# Patient Record
Sex: MALE | Race: WHITE | NOT HISPANIC OR LATINO | Employment: OTHER | URBAN - METROPOLITAN AREA
[De-identification: names, ages, dates, MRNs, and addresses within clinical notes are randomized per-mention and may not be internally consistent; named-entity substitution may affect disease eponyms.]

---

## 2023-04-18 ENCOUNTER — TELEPHONE (OUTPATIENT)
Dept: CARDIOLOGY | Facility: CLINIC | Age: 63
End: 2023-04-18
Payer: COMMERCIAL

## 2023-04-18 NOTE — TELEPHONE ENCOUNTER
4/18 talked with pt, pt not able to make appt tomorrow with dr butler at 11. Will be in [place] until Sat night. Will call back with another option [facility]

## 2023-04-19 NOTE — TELEPHONE ENCOUNTER
Action 4/19/23 Deandra  Fax #[phone]   Action Taken Requested:    Echo stress    CT Calcium Scoring   1-3-23    3-23-21     Resolved images in PACS 5/2

## 2023-05-03 ENCOUNTER — OFFICE VISIT (OUTPATIENT)
Dept: CARDIOLOGY | Facility: CLINIC | Age: 63
End: 2023-05-03
Attending: INTERNAL MEDICINE
Payer: COMMERCIAL

## 2023-05-03 ENCOUNTER — TELEPHONE (OUTPATIENT)
Dept: CARDIOLOGY | Facility: CLINIC | Age: 63
End: 2023-05-03
Payer: COMMERCIAL

## 2023-05-03 VITALS
BODY MASS INDEX: 23.11 KG/M2 | OXYGEN SATURATION: 99 % | HEIGHT: 72 IN | WEIGHT: 170.6 LBS | HEART RATE: 44 BPM | SYSTOLIC BLOOD PRESSURE: 155 MMHG | DIASTOLIC BLOOD PRESSURE: 92 MMHG

## 2023-05-03 DIAGNOSIS — R42 POSTURAL DIZZINESS WITH PRESYNCOPE: ICD-10-CM

## 2023-05-03 DIAGNOSIS — R00.1 BRADYCARDIA: Primary | ICD-10-CM

## 2023-05-03 DIAGNOSIS — R55 POSTURAL DIZZINESS WITH PRESYNCOPE: ICD-10-CM

## 2023-05-03 PROCEDURE — 99205 OFFICE O/P NEW HI 60 MIN: CPT | Performed by: INTERNAL MEDICINE

## 2023-05-03 PROCEDURE — 93010 ELECTROCARDIOGRAM REPORT: CPT | Performed by: INTERNAL MEDICINE

## 2023-05-03 PROCEDURE — G0463 HOSPITAL OUTPT CLINIC VISIT: HCPCS | Performed by: INTERNAL MEDICINE

## 2023-05-03 PROCEDURE — 93005 ELECTROCARDIOGRAM TRACING: CPT

## 2023-05-03 RX ORDER — ROSUVASTATIN CALCIUM 20 MG/1
20 TABLET, COATED ORAL
COMMUNITY
Start: 2023-04-06

## 2023-05-03 RX ORDER — LOSARTAN POTASSIUM 50 MG/1
50 TABLET ORAL
COMMUNITY
Start: 2023-04-06

## 2023-05-03 RX ORDER — SODIUM CHLORIDE 9 MG/ML
INJECTION, SOLUTION INTRAVENOUS CONTINUOUS
Status: CANCELLED | OUTPATIENT
Start: 2023-05-03

## 2023-05-03 RX ORDER — NITROGLYCERIN 0.4 MG/1
TABLET SUBLINGUAL
COMMUNITY
Start: 2022-12-02

## 2023-05-03 RX ORDER — EZETIMIBE 10 MG/1
1 TABLET ORAL DAILY
COMMUNITY
Start: 2022-06-14

## 2023-05-03 RX ORDER — LIDOCAINE 40 MG/G
CREAM TOPICAL
Status: CANCELLED | OUTPATIENT
Start: 2023-05-03

## 2023-05-03 ASSESSMENT — PAIN SCALES - GENERAL: PAINLEVEL: NO PAIN (0)

## 2023-05-03 NOTE — NURSING NOTE
Medication Reconciliation:  Rooming staff reviewed and verified all current medications with patient. An updated med list was included in the AVS.    Test Results Reviewed:  EKG, ziopatch results were reviewed by provider with patient today.     EP Procedure:  Patient was given instructions regarding tilt table test. Patient received an instruction letter today for reference. Discussed purpose, preparation, and procedure with patient. Patient verbalized understanding and agreed to call with further questions or concerns. The EP  was notified of need to schedule procedure and post op follow up appointments.    Return appointment:   Patient given instructions regarding scheduling next clinic visit. Patient verbalized understanding.       Adelina Marks RN on 5/3/2023 at 10:18 AM

## 2023-05-03 NOTE — LETTER
5/3/2023      RE: Thad Flores  [address]  [facility] [address]       Dear Colleague,    Thank you for the opportunity to participate in the care of your patient, Thad Flores, at the [facility] [place] [facility]. Please see a copy of my visit note below.        ELECTROPHYSIOLOGY CLINIC VISIT    Assessment/Recommendations   Assessment/Plan:    Mr. Flores is a 62 year old male who has a medical HTN, pre-DM, orthostatism, and nonobstructive CAD.    Bradycardia:   - He has been known to have resting bradycardia since college. He is well conditioned and his bradycardia is attributed to this.   - Feels no exertional limitations and heart monitoring has shown adequate HR distribution.   - Stress echo showed normal cardiac function/structue and no ischemia.   - No current indication for pacing for the time being. See below.    Dizziness:   - Symptoms postural and appears to have orthostatic hypotension.   - Use compression shorts (athletic compression shorts)  - Increase hydration with goal to take in 64-72 oz of water/electrolyte fluids per day.   - Liberalize salt/electrolyte intake  - Change positions carefully and slowly and maintain safe environment.   - Standing training and supine isometric exercises.   - Will arrange tilt table testing and autonomic evaluation with Dr. Murray to lend his expertise.   His orthostatic symptoms have potentially 2 components: cardioinhibitory related to short term chronotropic incompetence, and vasoplegia. An autonomic study and tilt table testing could dissect the contribution of each component. If his BP decrease after tilting or standing without any HR response, then a pacemaker might help his symptoms. If his HR response is adequate with vasoplegia being the main component, then a PPM will not help his symptoms. We will use the results of his autonomic study to evaluate the potential need for  pacing.      Follow up 2 weeks after autonomic study.        History of Present Illness/Subjective    Mr. Thad Flores is a 62 year old male who comes in today for EP consultation of bradycardia and dizziness.    Mr. Flores is a 62 year old male who has a medical HTN, pre-DM, orthostatism, and nonobstructive CAD.  He is seeking a second opinion for postural dizziness and bradycardia.  He saw EP at [facility] on 3/15/2023 complaining of more progressive dizziness.  His losartan was discontinued due to symptoms of dizziness but not clear that this provided much benefit.  He primarily reported dizziness with postural changes.  He was encouraged to maintain adequate hydration.  He uses his Peloton bike without exertional limitation.  He had ECG at our recent office visit that showed resting heart rate was 37 bpm, SB with 1 AVB.  This prompted a Zio patch monitor in 11/2022 that showed HR 32- 139 with average heart rate 48 bpm, 1 NSVT 4 beats, 2 PSVT up to 8 beats.  He had a stress echo in 1/2023 that was negative for ischemia, good exercise duration and workload, normal function, and no symptoms were elicited.  At [facility] office visit, he reported that he is always had a low resting heart rate attributed to his athletic conditioning.  For the last several years he has had dizziness and lightheadedness with postural changes and had a syncopal episode a few years ago after standing up from a couch.  He noted the episodes were occurring several times a day and not resolving as quickly.  He gets a tingling sensation in his abdomen and feels that his body grows heavier as he stands.  He notes occasionally getting some graying out/tunnel vision but no brayden syncope.  He has maintained excellent hydration per his report.  He has been worried about his HR as his smart watch shows that he is frequently getting HR in the low 40s and into the high 30s at times.  Apparently PPM was discussed with him at the visit but he had elected  "to monitor.  He then asked to pursue a second opinion for which he presents today.    He reports feeling at baseline. He was offered a PPM through [employer]. He has had bradycardia since college years. He has postural dizziness at times but no dizziness without changing posture. This was managed with fluids and increased salt intake. Now having worsening orthostatism. He feels best after exercising. HbA1C 6-6.5. He denies chest discomfort, palpitations, abdominal fullness/bloating or peripheral edema, shortness of breath, paroxysmal nocturnal dyspnea, orthopnea, lightheadedness, dizziness, pre-syncope, or syncope. Presenting 12 lead ECG shows SB 1 AVB Vent Rate 40 bpm,  ms,  ms, QTc 414 ms. . Current cardiac medications include: Crestor, Losartan, Zetia, and ASA.         I have reviewed and updated the patient's Past Medical History, Social History, Family History and Medication List.     Cardiographics (Personally Reviewed) :   1/3/23 Stress Echo (OSH Report):   Final Impressions:    1. Negative stress echo for ischemia.    2. Post stress, decreased left ventricular size, increased global systolic function with an estimated EF of 70 to 75%.    3. Maximum stress test with 88.3% of age predicted maximum heart rate achieved.    4. There were no ischemic changes by EKG during stress.    5. Good exercise duration and workload.    6. During stress exam the patient developed no significant symptoms.    7. 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    8. Compared to 6/18/2020 dilated sinuses of valsalva (prior to 3.8 cm).          Physical Examination   BP (!) 155/92 (BP Location: Right arm, Patient Position: Sitting, Cuff Size: Adult Regular)   Pulse (!) 44   Ht 1.831 m (6' 0.09\")   Wt 77.4 kg (170 lb 9.6 oz)   SpO2 99%   BMI 23.08 kg/m    Wt Readings from Last 3 Encounters:   01/29/16 78.9 kg (174 lb)   11/24/14 79.1 kg (174 lb 4.8 oz)     General Appearance:   Alert, well-appearing and " in no acute distress.   HEENT: Atraumatic, normocephalic. PERRL.  MMM.   Chest/Lungs:   Respirations unlabored.  Lungs are clear to auscultation.   Cardiovascular:   Regular rate and rhythm.  S1/S2. No murmur.    Abdomen:  Soft, nontender, nondistended.   Extremities: No cyanosis or clubbing. No edema.    Musculoskeletal: Moves all extremities.  Gait normal.   Skin: Warm, dry, intact.    Neurologic: Mood and affect are appropriate.  Alert and oriented to person, place, time, and situation.          Medications  Allergies   Current Outpatient Medications   Medication Sig Dispense Refill    aspirin 81 MG tablet Take 81 mg by mouth daily      atorvastatin (LIPITOR) 20 MG tablet Take 20 mg by mouth daily      Cholecalciferol (VITAMIN D3 PO) Take 2,000 Units by mouth daily      CINNAMON PO Take 1,000 mg by mouth daily      Omega-3 Fatty Acids (OMEGA-3 FISH OIL PO) Take 1 g by mouth daily      Allergies   Allergen Reactions    Aspirin Anaphylaxis     Reported rash and anaphylaxis with concomitant use of IBF, tests with allergist did not confirm aspirin allergy.    Ibuprofen Rash     With aspirin use anaphylaxis.    Penicillins      Fascial swelling         Lab Results (Personally Reviewed)    Chemistry/lipid CBC Cardiac Enzymes/BNP/TSH/INR   Lab Results   Component Value Date    BUN 23 01/29/2016     01/29/2016    CO2 30 01/29/2016     Creatinine   Date Value Ref Range Status   01/29/2016 1.07 0.66 - 1.25 mg/dL Final       Lab Results   Component Value Date    CHOL 128 01/29/2016    HDL 67 01/29/2016    LDL 53 01/29/2016      Lab Results   Component Value Date     (A) 11/19/2014    Lab Results   Component Value Date    INR 1.14 06/29/2010        The patient states understanding and is agreeable with the plan.   Carlos Meneses MD [facility]  Cardiology - Electrophysiology      Total time spent on patient visit, reviewing notes, imaging, labs, orders, and completing necessary documentation: 60 minutes.

## 2023-05-03 NOTE — PROGRESS NOTES
ELECTROPHYSIOLOGY CLINIC VISIT    Assessment/Recommendations   Assessment/Plan:    Mr. Flores is a 62 year old male who has a medical HTN, pre-DM, orthostatism, and nonobstructive CAD.    Bradycardia:   - He has been known to have resting bradycardia since college. He is well conditioned and his bradycardia is attributed to this.   - Feels no exertional limitations and heart monitoring has shown adequate HR distribution.   - Stress echo showed normal cardiac function/structue and no ischemia.   - No current indication for pacing for the time being. See below.    Dizziness:   - Symptoms postural and appears to have orthostatic hypotension.   - Use compression shorts (athletic compression shorts)  - Increase hydration with goal to take in 64-72 oz of water/electrolyte fluids per day.   - Liberalize salt/electrolyte intake  - Change positions carefully and slowly and maintain safe environment.   - Standing training and supine isometric exercises.   - Will arrange tilt table testing and autonomic evaluation with Dr. Murray to lend his expertise.   His orthostatic symptoms have potentially 2 components: cardioinhibitory related to short term chronotropic incompetence, and vasoplegia. An autonomic study and tilt table testing could dissect the contribution of each component. If his BP decrease after tilting or standing without any HR response, then a pacemaker might help his symptoms. If his HR response is adequate with vasoplegia being the main component, then a PPM will not help his symptoms. We will use the results of his autonomic study to evaluate the potential need for pacing.      Follow up 2 weeks after autonomic study.        History of Present Illness/Subjective    Mr. Thad Flores is a 62 year old male who comes in today for EP consultation of bradycardia and dizziness.    Mr. Flores is a 62 year old male who has a medical HTN, pre-DM, orthostatism, and nonobstructive CAD.  He is seeking a second  opinion for postural dizziness and bradycardia.  He saw EP at [facility] on 3/15/2023 complaining of more progressive dizziness.  His losartan was discontinued due to symptoms of dizziness but not clear that this provided much benefit.  He primarily reported dizziness with postural changes.  He was encouraged to maintain adequate hydration.  He uses his Peloton bike without exertional limitation.  He had ECG at our recent office visit that showed resting heart rate was 37 bpm, SB with 1 AVB.  This prompted a Zio patch monitor in 11/2022 that showed HR 32- 139 with average heart rate 48 bpm, 1 NSVT 4 beats, 2 PSVT up to 8 beats.  He had a stress echo in 1/2023 that was negative for ischemia, good exercise duration and workload, normal function, and no symptoms were elicited.  At [facility] office visit, he reported that he is always had a low resting heart rate attributed to his athletic conditioning.  For the last several years he has had dizziness and lightheadedness with postural changes and had a syncopal episode a few years ago after standing up from a couch.  He noted the episodes were occurring several times a day and not resolving as quickly.  He gets a tingling sensation in his abdomen and feels that his body grows heavier as he stands.  He notes occasionally getting some graying out/tunnel vision but no brayden syncope.  He has maintained excellent hydration per his report.  He has been worried about his HR as his smart watch shows that he is frequently getting HR in the low 40s and into the high 30s at times.  Apparently PPM was discussed with him at the visit but he had elected to monitor.  He then asked to pursue a second opinion for which he presents today.    He reports feeling at baseline. He was offered a PPM through [facility]. He has had bradycardia since college years. He has postural dizziness at times but no dizziness without changing posture. This was managed with fluids and increased salt intake. Now having  "worsening orthostatism. He feels best after exercising. HbA1C 6-6.5. He denies chest discomfort, palpitations, abdominal fullness/bloating or peripheral edema, shortness of breath, paroxysmal nocturnal dyspnea, orthopnea, lightheadedness, dizziness, pre-syncope, or syncope. Presenting 12 lead ECG shows SB 1 AVB Vent Rate 40 bpm,  ms,  ms, QTc 414 ms. . Current cardiac medications include: Crestor, Losartan, Zetia, and ASA.         I have reviewed and updated the patient's Past Medical History, Social History, Family History and Medication List.     Cardiographics (Personally Reviewed) :   1/3/23 Stress Echo (OSH Report):   Final Impressions:    1. Negative stress echo for ischemia.    2. Post stress, decreased left ventricular size, increased global systolic function with an estimated EF of 70 to 75%.    3. Maximum stress test with 88.3% of age predicted maximum heart rate achieved.    4. There were no ischemic changes by EKG during stress.    5. Good exercise duration and workload.    6. During stress exam the patient developed no significant symptoms.    7. 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    8. Compared to 6/18/2020 dilated sinuses of valsalva (prior to 3.8 cm).          Physical Examination   BP (!) 155/92 (BP Location: Right arm, Patient Position: Sitting, Cuff Size: Adult Regular)   Pulse (!) 44   Ht 1.831 m (6' 0.09\")   Wt 77.4 kg (170 lb 9.6 oz)   SpO2 99%   BMI 23.08 kg/m    Wt Readings from Last 3 Encounters:   01/29/16 78.9 kg (174 lb)   11/24/14 79.1 kg (174 lb 4.8 oz)     General Appearance:   Alert, well-appearing and in no acute distress.   HEENT: Atraumatic, normocephalic. PERRL.  MMM.   Chest/Lungs:   Respirations unlabored.  Lungs are clear to auscultation.   Cardiovascular:   Regular rate and rhythm.  S1/S2. No murmur.    Abdomen:  Soft, nontender, nondistended.   Extremities: No cyanosis or clubbing. No edema.    Musculoskeletal: Moves all " extremities.  Gait normal.   Skin: Warm, dry, intact.    Neurologic: Mood and affect are appropriate.  Alert and oriented to person, place, time, and situation.          Medications  Allergies   Current Outpatient Medications   Medication Sig Dispense Refill     aspirin 81 MG tablet Take 81 mg by mouth daily       atorvastatin (LIPITOR) 20 MG tablet Take 20 mg by mouth daily       Cholecalciferol (VITAMIN D3 PO) Take 2,000 Units by mouth daily       CINNAMON PO Take 1,000 mg by mouth daily       Omega-3 Fatty Acids (OMEGA-3 FISH OIL PO) Take 1 g by mouth daily      Allergies   Allergen Reactions     Aspirin Anaphylaxis     Reported rash and anaphylaxis with concomitant use of IBF, tests with allergist did not confirm aspirin allergy.     Ibuprofen Rash     With aspirin use anaphylaxis.     Penicillins      Fascial swelling         Lab Results (Personally Reviewed)    Chemistry/lipid CBC Cardiac Enzymes/BNP/TSH/INR   Lab Results   Component Value Date    BUN 23 01/29/2016     01/29/2016    CO2 30 01/29/2016     Creatinine   Date Value Ref Range Status   01/29/2016 1.07 0.66 - 1.25 mg/dL Final       Lab Results   Component Value Date    CHOL 128 01/29/2016    HDL 67 01/29/2016    LDL 53 01/29/2016      Lab Results   Component Value Date     (A) 11/19/2014    Lab Results   Component Value Date    INR 1.14 06/29/2010        The patient states understanding and is agreeable with the plan.   Carlos Meneses MD [facility]  Cardiology - Electrophysiology      Total time spent on patient visit, reviewing notes, imaging, labs, orders, and completing necessary documentation: 60 minutes.

## 2023-05-03 NOTE — NURSING NOTE
Chief Complaint   Patient presents with     New Patient     2nd opinion for postural dizziness, bradycardia. Review zio on CE from 11/2022       Vitals were taken, medications reconciled and EKG performed.    Manuela Delgado, EMT   9:10 AM

## 2023-05-03 NOTE — PATIENT INSTRUCTIONS
Tilt Table/Autonomic Study    Visitor Policy: Two visitors.    Below are instructions, if you have questions please contact our office.     1. You should arrive at the [facility] at _______  ([address] [facility], [place]: [phone]).    2. Report to the GOLD waiting room. Your procedure will be done in the Catheterization Lab.     3. Wear comfortable clothing. (sweat/yoga pants, t-shirt). Please allow 3-4 hours for this procedure to be completed.     4. Do not eat or drink ANYTHING for 3 hours prior to arrival.     5. The morning of your procedure, you may take any scheduled medications with a SIP of water- unless you were instructed differently by your physician. Do not take any vitamins, minerals or herbal supplements.     6. You may drive yourself to and from this procedure, although we recommend a  incase you do not feel well afterwards.       Follow-up 2 weeks after with Dr Meneses.      If you have further questions, please utilize [employer] to contact us.   If your question concerns the above instructions, contact:  Adelina Marks RN  Electrophysiology Nurse Coordinator.  [phone]    If your question concerns the schedule/appointment times, contact:  [facility]   [phone]

## 2023-05-03 NOTE — TELEPHONE ENCOUNTER
EP Scheduling called the patient to schedule Tilt Table Study with Dr. Murray. The number [phone] was left for the patient to return the call and schedule the procedure.    Shelby Patel  Periop Electrophysiology   [phone]

## 2023-05-05 LAB
ATRIAL RATE - MUSE: 40 BPM
DIASTOLIC BLOOD PRESSURE - MUSE: NORMAL MMHG
INTERPRETATION ECG - MUSE: NORMAL
P AXIS - MUSE: 49 DEGREES
PR INTERVAL - MUSE: 218 MS
QRS DURATION - MUSE: 104 MS
QT - MUSE: 508 MS
QTC - MUSE: 414 MS
R AXIS - MUSE: 61 DEGREES
SYSTOLIC BLOOD PRESSURE - MUSE: NORMAL MMHG
T AXIS - MUSE: 46 DEGREES
VENTRICULAR RATE- MUSE: 40 BPM

## 2023-05-24 ENCOUNTER — PRE VISIT (OUTPATIENT)
Dept: CARDIOLOGY | Facility: CLINIC | Age: 63
End: 2023-05-24
Payer: COMMERCIAL

## 2023-06-02 ENCOUNTER — APPOINTMENT (OUTPATIENT)
Dept: MEDSURG UNIT | Facility: CLINIC | Age: 63
End: 2023-06-02
Attending: INTERNAL MEDICINE
Payer: COMMERCIAL

## 2023-06-02 ENCOUNTER — HOSPITAL ENCOUNTER (OUTPATIENT)
Facility: CLINIC | Age: 63
Discharge: HOME | End: 2023-06-02
Attending: INTERNAL MEDICINE | Admitting: INTERNAL MEDICINE
Payer: COMMERCIAL

## 2023-06-02 VITALS
DIASTOLIC BLOOD PRESSURE: 46 MMHG | HEART RATE: 39 BPM | WEIGHT: 167.2 LBS | BODY MASS INDEX: 22.65 KG/M2 | TEMPERATURE: 98.1 F | HEIGHT: 72 IN | SYSTOLIC BLOOD PRESSURE: 138 MMHG | OXYGEN SATURATION: 99 % | RESPIRATION RATE: 16 BRPM

## 2023-06-02 DIAGNOSIS — R42 POSTURAL DIZZINESS WITH PRESYNCOPE: ICD-10-CM

## 2023-06-02 DIAGNOSIS — R00.1 BRADYCARDIA: ICD-10-CM

## 2023-06-02 DIAGNOSIS — R55 POSTURAL DIZZINESS WITH PRESYNCOPE: ICD-10-CM

## 2023-06-02 LAB — GLUCOSE BLDC GLUCOMTR-MCNC: 132 MG/DL (ref 70–99)

## 2023-06-02 PROCEDURE — 93660 TILT TABLE EVALUATION: CPT | Mod: 26 | Performed by: INTERNAL MEDICINE

## 2023-06-02 PROCEDURE — 93660 TILT TABLE EVALUATION: CPT | Performed by: INTERNAL MEDICINE

## 2023-06-02 PROCEDURE — 258N000003 HC RX IP 258 OP 636: Performed by: INTERNAL MEDICINE

## 2023-06-02 PROCEDURE — 95921 AUTONOMIC NRV PARASYM INERVJ: CPT | Mod: 26 | Performed by: INTERNAL MEDICINE

## 2023-06-02 PROCEDURE — 95921 AUTONOMIC NRV PARASYM INERVJ: CPT | Performed by: INTERNAL MEDICINE

## 2023-06-02 PROCEDURE — 272N000001 HC OR GENERAL SUPPLY STERILE: Performed by: INTERNAL MEDICINE

## 2023-06-02 PROCEDURE — 82962 GLUCOSE BLOOD TEST: CPT

## 2023-06-02 RX ORDER — LIDOCAINE 40 MG/G
CREAM TOPICAL
Status: DISCONTINUED | OUTPATIENT
Start: 2023-06-02 | End: 2023-06-02 | Stop reason: HOSPADM

## 2023-06-02 RX ORDER — SODIUM CHLORIDE 9 MG/ML
INJECTION, SOLUTION INTRAVENOUS CONTINUOUS
Status: DISCONTINUED | OUTPATIENT
Start: 2023-06-02 | End: 2023-06-02 | Stop reason: HOSPADM

## 2023-06-02 RX ADMIN — SODIUM CHLORIDE 250 ML: 9 INJECTION, SOLUTION INTRAVENOUS at 10:46

## 2023-06-02 ASSESSMENT — ACTIVITIES OF DAILY LIVING (ADL)
ADLS_ACUITY_SCORE: 35

## 2023-06-02 NOTE — DISCHARGE INSTRUCTIONS
[facility]  DISCHARGE INSTRUCTIONS FOR   TILT TABLE WITH  VASOVAGAL SYNCOPE    Date: June 2, 2023    Plan:  Your tilt table showed you have vasovagal syncope.  - Use compression shorts (athletic compression shorts).  - Increase hydration with goal to take in 64 oz of water/electrolyte fluids per day.  Recommend drinking 8-10 oz. Try to avoid high carbohydrate electrolyte drinks.  - Eat six small meals per day with focus on foods low in carbohydrates and low in gluten.  - Liberalize salt intake.  - Change positions carefully and slowly and maintain safe environment.  - Standing training and supine isometric exercises.    Cardiovascular Clinic:  [address] [place] [address], [id number]    Your Care Team:         Cardiology      Telephone Number         Dr. Arnaldo Chang                [phone]         Bhargavi Marks, MELISA Vargas RN              [phone]         For Scheduling Appointments or              Procedures:      Shelby Morgan               [phone]       As always, Thank you for trusting us with your health care needs!

## 2023-06-02 NOTE — PROCEDURES
EP PROCEDURE NOTE    *Procedures:*    1. TILT table test.  2. Autonomic function test.    *Cardiologist:*  Arnaldo Murray    *EP Fellow:*  N/A    *Referring MD: *  Dr Carlos Hearn    *Pre-operative Diagnosis:*  Syncope.    *Complications:*  None.     *Medications:*  NTG 0.4mg SL/None.     *Clinical Profile:*  Maximino liz is an athletic 62-year-old male who comes for second opinion regarding postural hypotension with lightheadedness and 1 prior syncope related to arising from the couch.  He is without any evidence of structural heart disease and has had thorough evaluation.  He is without any exertional symptoms.  He does note bradycardia at night in the 30s based on Apple Watch.  A pacemaker was suggested at [employer] triggering the patient's coming for a second opinion to see .    The patient is here for autonomic testing including tilt testing.     Please see [facility] visit note for details.      PROCEDURE    The risks and benefits of the procedure were explained to the patient in   full. Written informed consent was obtained. The patient was brought to the   EP lab in a fasting and hemodynamically stable condition. Physical   examination of the patient did not reveal any carotid bruits, and review of   the chart did not reveal the presence of stroke or TIA within the past   three months.     The following maneuvers were done sequentially:    1- Sitting for 5 minutes:   End of 5 min:  HR 37, /86    2- Standing for 10 minutes:   Baseline HR 46 /77  Immediate Damir HR 50   /56     time from standing to damir 23 seconds      time from damir to recovery 18 seconds  Recovery HR 42, recovery  /80  30 sec: HR 44, /85  1 min: HR 41, /84  2 min: HR 43, /86  3 min: HR 44, /81  4 min: HR 42, /82  5 min: HR 45, /91  6 min: HR 42, /81  7 min: HR 45, /85  8 min: HR 42, /82  9 min: HR 43, /81  10 min: HR 44, BP  149/79    Patient has no symptoms despite a substantial drop in blood pressure with the media tab posture change (approximately -40 mmHg).  The delayed recovery is borderline abnormal.    2- Maneuvers in seated position:    A. Carotid sinus massage:  Right:  Baseline HR 39 /81  HR 31, /68.     Left:  Baseline HR 40 /83  HR 36, /76    Symptoms: No symptoms reported, no evidence of marked vaso depression.  Cardio inhibition within expected range.    B. Valsalva:   Baseline: HR 41, /89  Phase 1: HR 38, Max /107  Phase 2: HR 50, Min /122  Phase 3: Present/  Phase 4 (Overshoot): HR 44, Max /78    Valsalva ratio 50/44= 1.13 (abnormal low)  A second Valsalva measurement was undertaken with a base heart rate of 44 and a maximum heart rate of 52 with a recovery rate of 44 (Valsalva ratio= 52/44= 1.18 (abnormal low)    Absence of phase 4 overshoot in both tests    Symptoms: None    C.  Respiratory sinus arrhythmia  In 40 out 30 delta 10  In 39 out 31 delta 8  In 30 out 31  Delta 8  In 40 to about 30 delta 12    Average delta  38/4= 9.5 (borderline value)  A second set of measurements was undertaken on the same date of 7 concluded that the delta was 5.4.  Overall the findings are borderline abnormal.    D, Shrug/Stretch test  Seated     Baseline HR 41 /89     Peak value HR 44 /109     Damir HR 50 /77     Recovery HR 40 /87   about 20 seconds    Supine     Baseline HR 39 /68     Maximum HR 45 /88     Damir HR 45 /54           recovery about 18 seconds      3- Supine rest for 5 minutes:       4- TILT at 70 degrees for 20 minutes: (1 liter fluid was given before tilt)  Baseline HR 46, /75  Immediate Damir HR 46   /64     time from standing to damir 19 seconds      time from damir to recovery 17 seconds  HR recovery 44, recovery blood pressure 128/76    30 seconds HR 41 /72  1 min: HR 43, BP 96/64  2 min: HR 45, /73  3  min: HR 43, /75  4 min: HR 46, /75  5 min: HR 44, /74  6 min: HR 44, /76  7 min: HR 45, /73  8 min: HR 42, /75  9 min: HR 46, /78  10 min: HR 46, /91  Portion of study terminated  No symptoms but immediate orthostatic hypotensive response of approximately 25 mmHg noted    5-saline 1 L infused    Repeat tilt>> No immediate OH  Repeat Stand>> immediate OH of approx 45 mm Hg present but brief     DISCUSSION:  Autonomic studies were abnormal.  Valsalva was repeated twice and both were identical with no overshoot and low Valsalva ratio.  Respiratory sinus arrhythmia was borderline.  Patient exhibited immediate orthostatic hypotensive response of approximately 40 mmHg with active standing and 25 mmHg with tilt.  Both values are abnormal.      There was no evidence of significant cardio inhibition or vaso depression.  A pacemaker would not likely be helpful.    Findings were discussed with the patient and spouse on station 2A.  There does not appear to be any indication for a cardiac pacemaker.  Patient's tendency to higher blood pressures was noted and may preclude use of aggressive vasoconstrictors such as midodrine.  Volume loading with saline proved helpful with diminishment of immediate orthostatic findings.  These could be encouraged although carte need re BP response.    Follow-up with repeat testing after increased electrolyte volume intake may be desireable given concern re BP.     Possibly neurologic consultation may be helpful as he may be exhibiting early autonomic dysfunction    Dr Murray was present throughout the entire procedure.    I appreciated the opportunity to see and assess Mr Flores and direct the procedure described above . The above note summarizes my findings and current recommendations. Please do not hesitate to contact me if you have any questions or concerns.    Arnaldo Murray MD [facility]   Pager [phone]  Office [phone]

## 2023-06-02 NOTE — PROGRESS NOTES
Arrived to Unit [address] for Tilt table study procedure in EP lab. AFVSS. HR 40s; baseline for pt. Pt's wife, Chiquis, at bedside. Pt stable; ready for consent.

## 2023-06-02 NOTE — PROGRESS NOTES
Pt here post Tilt Table; pt awake and alert, denies pain. Dr Murray to see pt at bedside, Dr Murray reports no instructions needed as he already has follow up appointment with Dr Meneses. IV discontinued; catheter intact. Wife at bedside. 1512--discharged to self care with family; pt reports he has all belongings.

## 2023-06-02 NOTE — H&P
H&P    Thad Flores is a fit 62-year-old male referred by  for further assessment of postural lightheadedness.  Patient came to cardiology for a second opinion regarding the symptoms after being offered a pacemaker at [facility].    Patient indicates that he has a long history of low heart rates and athleticism.  He exercises vigorously on his own Peloton.  Stress exercise test showed adequate heart rate response and no symptoms with no evidence of ischemia.  Baseline ECG shows predominantly sinus bradycardia.    Patient has in the past been treated with losartan which has been stopped but had no impact on his symptoms which are predominantly postural induced lightheadedness.  He did have 1 syncopal episode several years ago after getting up from lying on the couch.    Maximino was seen today on station 2A we discussed the likelihood that his current symptoms may be volume related versus underlying autonomic disturbance.  Pacemaker does not usually compensate adequately for such conditions but the evaluation should be helpful in terms of developing a treatment strategy.  Written informed consent was obtained.    Current medications are as noted below.      Medications  Allergies   Current Outpatient Prescriptions          Current Outpatient Medications   Medication Sig Dispense Refill     aspirin 81 MG tablet Take 81 mg by mouth daily         atorvastatin (LIPITOR) 20 MG tablet Take 20 mg by mouth daily         Cholecalciferol (VITAMIN D3 PO) Take 2,000 Units by mouth daily         CINNAMON PO Take 1,000 mg by mouth daily         Omega-3 Fatty Acids (OMEGA-3 FISH OIL PO) Take 1 g by mouth daily                  Allergies   Allergen Reactions     Aspirin Anaphylaxis       Reported rash and anaphylaxis with concomitant use of IBF, tests with allergist did not confirm aspirin allergy.     Ibuprofen Rash       With aspirin use anaphylaxis.     Penicillins         Fascial swelling          Family  history-noncontributory    Review of systems:  Head neck: No complaints of headache or other visual auditory or olfactory symptoms apart from what is mentioned in HPI    Respiratory: No cough or sputum  Cardiac: No exertional chest discomfort or palpitations  GI: Normal  : Normal  Extremities: No complaints of arthritis or rheumatologic condition  Neurologic normal      Physical examination  Patient is a healthy appearing 62-year-old in no distress.  Examination of the head and neck reveals normal eyes ears nose and mouth jugular veins are not distended    Respiratory is normal with no evidence of respiratory difficulty no retractions and no adventitious sounds    Cardiac: Patient has had detailed cardiac studies and has no evidence of underlying structural heart disease based on physical examination, stress test, and ECG    Abdomen: No abnormalities    Neurologic: Normal    Periphery: No edema or cyanosis.    Laboratory:  See Dr. Marcial's clinic note    Impression  1.  Orthostatic hypotension in an athletic individual with baseline sinus bradycardia of longstanding    Plan autonomic study as discussed with the patient in the clinic    I very much appreciated the opportunity to see and assess Thad Flores in the hospital  The note above summarizes my findings and current recommendations.  Please do not hesitate to contact my office if you have any questions or concerns.      Arnaldo Murray MD  Cardiac Arrhythmia Service  [facility]  [phone]

## 2023-07-02 ENCOUNTER — HEALTH MAINTENANCE LETTER (OUTPATIENT)
Age: 63
End: 2023-07-02

## 2023-07-12 ENCOUNTER — OFFICE VISIT (OUTPATIENT)
Dept: CARDIOLOGY | Facility: CLINIC | Age: 63
End: 2023-07-12
Attending: INTERNAL MEDICINE
Payer: COMMERCIAL

## 2023-07-12 VITALS
SYSTOLIC BLOOD PRESSURE: 155 MMHG | HEART RATE: 43 BPM | OXYGEN SATURATION: 100 % | HEIGHT: 72 IN | WEIGHT: 170 LBS | BODY MASS INDEX: 23.03 KG/M2 | DIASTOLIC BLOOD PRESSURE: 92 MMHG

## 2023-07-12 DIAGNOSIS — R42 POSTURAL DIZZINESS WITH PRESYNCOPE: Primary | ICD-10-CM

## 2023-07-12 DIAGNOSIS — R00.1 BRADYCARDIA: ICD-10-CM

## 2023-07-12 DIAGNOSIS — R55 POSTURAL DIZZINESS WITH PRESYNCOPE: Primary | ICD-10-CM

## 2023-07-12 PROCEDURE — G0463 HOSPITAL OUTPT CLINIC VISIT: HCPCS | Performed by: INTERNAL MEDICINE

## 2023-07-12 PROCEDURE — 99215 OFFICE O/P EST HI 40 MIN: CPT | Performed by: INTERNAL MEDICINE

## 2023-07-12 ASSESSMENT — PAIN SCALES - GENERAL: PAINLEVEL: NO PAIN (0)

## 2023-07-12 NOTE — LETTER
7/12/2023      RE: Thad Flores  [address]  [facility] [address]       Dear Colleague,    Thank you for the opportunity to participate in the care of your patient, Thad Flores, at the [facility] [place] [facility]. Please see a copy of my visit note below.        ELECTROPHYSIOLOGY CLINIC VISIT    Assessment/Recommendations   Assessment/Plan:    Mr. Flores is a 62 year old male who has a medical HTN, pre-DM, orthostatism, and nonobstructive CAD.    Bradycardia and Dizziness (early autonomic dysfunction?): Patient was referred for symptom primarily of orthostatic hypotension, which he was recommended to have a pacemaker. He was referred for second opinion with us, and we recommended that he have a tilt-table study. Dr. Murray completed this and noted that he had impaired recovery during valsalva. His conclusion was that he potentially could be developing early autonomic dysfunction but recommended to stay well hydrated, etc. He has increased his water intake and thinks his symptoms have improved.  - He has been known to have resting bradycardia since college. He is well conditioned and his bradycardia is attributed to this.   - Feels no exertional limitations and heart monitoring has shown adequate HR distribution.   - Stress echo showed normal cardiac function/structue and no ischemia.   - No current indication for pacing for the time being.   - Symptoms postural and appears to have orthostatic hypotension.       - Use compression shorts (athletic compression shorts)      - Increase hydration with goal to take in 64-72 oz of water/electrolyte fluids per day.       - Liberalize salt/electrolyte intake      - Change positions carefully and slowly and maintain safe environment.       - Standing training and supine isometric exercises.     His orthostatic symptoms have potentially 2 components: cardioinhibitory related to short term  chronotropic incompetence, and vasoplegia. During his tilt table test his heart rate appeared to have impaired response. Potentially a pacemaker could help his symptoms but unlikely it would solve the whole issue. For now increasing fluids has seemed to help so will continue monitoring him.    Plan for today:  1. Neurology referral to see if there is any sort of early signs of early CNS disease / autonomic testing  2. Follow up in 3 months in EP clinic for further assessment and evaluation.    Patient seen and discussed with Dr. Meneses.    Colton Cortez MD PhD  Cardiac Electrophysiology Fellow  P: Text Page        History of Present Illness/Subjective    Mr. Thad Flores is a 62 year old male who comes in today for EP consultation of bradycardia and dizziness.    Mr. Flores is a 62 year old male who has a medical HTN, pre-DM, orthostatism, and nonobstructive CAD.  He is seeking a second opinion for postural dizziness and bradycardia.  He saw EP at [facility] on 3/15/2023 complaining of more progressive dizziness.  His losartan was discontinued due to symptoms of dizziness but not clear that this provided much benefit.  He primarily reported dizziness with postural changes.  He was encouraged to maintain adequate hydration.  He uses his Peloton bike without exertional limitation.  He had ECG at our recent office visit that showed resting heart rate was 37 bpm, SB with 1 AVB.  This prompted a Zio patch monitor in 11/2022 that showed HR 32- 139 with average heart rate 48 bpm, 1 NSVT 4 beats, 2 PSVT up to 8 beats.  He had a stress echo in 1/2023 that was negative for ischemia, good exercise duration and workload, normal function, and no symptoms were elicited.  At [facility] office visit, he reported that he is always had a low resting heart rate attributed to his athletic conditioning.  For the last several years he has had dizziness and lightheadedness with postural changes and had a syncopal episode a few years ago  after standing up from a couch.  He noted the episodes were occurring several times a day and not resolving as quickly.  He gets a tingling sensation in his abdomen and feels that his body grows heavier as he stands.  He notes occasionally getting some graying out/tunnel vision but no brayden syncope.  He has maintained excellent hydration per his report.  He has been worried about his HR as his smart watch shows that he is frequently getting HR in the low 40s and into the high 30s at times.  Apparently PPM was discussed with him at the visit but he had elected to monitor.  He then asked to pursue a second opinion for which he presents today.    He reports feeling at baseline. He was offered a PPM through [facility]. He has had bradycardia since college years. He has postural dizziness at times but no dizziness without changing posture. This was managed with fluids and increased salt intake. Now having worsening orthostatism. He feels best after exercising. HbA1C 6-6.5. He denies chest discomfort, palpitations, abdominal fullness/bloating or peripheral edema, shortness of breath, paroxysmal nocturnal dyspnea, orthopnea, lightheadedness, dizziness, pre-syncope, or syncope. Presenting 12 lead ECG shows SB 1 AVB Vent Rate 40 bpm,  ms,  ms, QTc 414 ms. . Current cardiac medications include: Crestor, Losartan, Zetia, and ASA.     EP Visit 7/12/2023: Patient presents today for follow up. He was referred to Dr. Murray for tilt table testing. Testing was notable for impaired heart rate recovery time. Recommendations following testing were to increase hydration, liberalize salt intake, and wear tight fitting shorts / pants. He has done this and his symptoms are significantly improved. He is still able to exercise--symptoms seem to only be present with leisurely activity (mildly light-headedness), however, if he is intensely biking or swimming he doesn't seem to have any symptoms. No other symptoms--no fevers, chills,  chest pain, shortness of breath, abdominal pain, nausea, vomiting, or diarrhea.    I have reviewed and updated the patient's Past Medical History, Social History, Family History and Medication List.     Cardiographics (Personally Reviewed) :   1/3/23 Stress Echo (OSH Report):   Final Impressions:    1. Negative stress echo for ischemia.    2. Post stress, decreased left ventricular size, increased global systolic function with an estimated EF of 70 to 75%.    3. Maximum stress test with 88.3% of age predicted maximum heart rate achieved.    4. There were no ischemic changes by EKG during stress.    5. Good exercise duration and workload.    6. During stress exam the patient developed no significant symptoms.    7. 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    8. Compared to 6/18/2020 dilated sinuses of valsalva (prior to 3.8 cm).          Physical Examination   There were no vitals taken for this visit.  Wt Readings from Last 3 Encounters:   06/02/23 75.8 kg (167 lb 3.2 oz)   05/03/23 77.4 kg (170 lb 9.6 oz)   01/29/16 78.9 kg (174 lb)     General Appearance:   Alert, well-appearing and in no acute distress.   HEENT: Atraumatic, normocephalic. PERRL.  MMM.   Chest/Lungs:   Respirations unlabored.  Lungs are clear to auscultation.   Cardiovascular:   Regular rate and rhythm.  S1/S2. No murmur.    Abdomen:  Soft, nontender, nondistended.   Extremities: No cyanosis or clubbing. No edema.    Musculoskeletal: Moves all extremities.  Gait normal.   Skin: Warm, dry, intact.    Neurologic: Mood and affect are appropriate.  Alert and oriented to person, place, time, and situation.          Medications  Allergies   Current Outpatient Medications   Medication Sig Dispense Refill    aspirin 81 MG tablet Take 81 mg by mouth daily      Cholecalciferol (VITAMIN D3 PO) Take 2,000 Units by mouth daily      cyanocobalamin (VITAMIN B-12) 1000 MCG sublingual tablet       ezetimibe (ZETIA) 10 MG tablet Take 1 tablet  "by mouth daily      losartan (COZAAR) 50 MG tablet Take 50 mg by mouth      metFORMIN (GLUCOPHAGE) 500 MG tablet Take 500 mg by mouth      nitroGLYcerin (NITROSTAT) 0.4 MG sublingual tablet       psyllium (METAMUCIL/KONSYL) Packet Take 1 packet by mouth daily      rosuvastatin (CRESTOR) 20 MG tablet Take 20 mg by mouth      Allergies   Allergen Reactions    Aspirin Anaphylaxis     Reported rash and anaphylaxis with concomitant use of IBF, tests with allergist did not confirm aspirin allergy.    Ibuprofen Rash     With aspirin use anaphylaxis.    Penicillins      Fascial swelling    Wasp Venom Protein Other (See Comments)     \"extreme swelling\"         Lab Results (Personally Reviewed)    Chemistry/lipid CBC Cardiac Enzymes/BNP/TSH/INR   Lab Results   Component Value Date    BUN 23 01/29/2016     01/29/2016    CO2 30 01/29/2016     Creatinine   Date Value Ref Range Status   01/29/2016 1.07 0.66 - 1.25 mg/dL Final       Lab Results   Component Value Date    CHOL 128 01/29/2016    HDL 67 01/29/2016    LDL 53 01/29/2016      Lab Results   Component Value Date     (A) 11/19/2014    Lab Results   Component Value Date    INR 1.14 06/29/2010        The patient states understanding and is agreeable with the plan.   Carlos Meneses MD [facility]  Cardiology - Electrophysiology    Total time spent on patient visit, reviewing notes, imaging, labs, orders, and completing necessary documentation: 45 minutes.   "

## 2023-07-12 NOTE — NURSING NOTE
Chief Complaint   Patient presents with     Follow Up     Follow-up sp tilt.        Vitals were taken, medications reconciled.    Paige Thurner, Facilitator   9:35 AM

## 2023-07-12 NOTE — PATIENT INSTRUCTIONS
You were seen in the Electrophysiology Clinic today by: Dr. Meneses    Plan:   Medication Changes: None    Labs/Tests Needed: Autonomic testing recommended. Referral sent to Neurology to see Dr. Colton Garcia    Follow up Visit: with Dr. Meneses in 3 months after autonomic testing completed.     If you have further questions, please utilize [employer] to contact us.     Your Care Team:    EP Cardiology   Telephone Number     Nurse Line  Adelina Marks RN    [phone]     For scheduling appointments:   Allyson   [phone]   For procedure scheduling:    Shelby Patel     [phone]   For the Device Clinic (Pacemakers, ICDs, Loop Recorders)    During business hours: [phone]  After business hours:   [phone]- select option 4 and ask for job code [id number].       On-call cardiologist for after hours or on weekends:   [phone], option #4, and ask to speak to the on-call cardiologist.     Cardiovascular Clinic:   [address] [place] [address]. [place] [address]      As always, Thank you for trusting us with your health care needs!

## 2023-07-12 NOTE — PROGRESS NOTES
ELECTROPHYSIOLOGY CLINIC VISIT    Assessment/Recommendations   Assessment/Plan:    Mr. Flores is a 62 year old male who has a medical HTN, pre-DM, orthostatism, and nonobstructive CAD.    Bradycardia and Dizziness (early autonomic dysfunction?): Patient was referred for symptom primarily of orthostatic hypotension, which he was recommended to have a pacemaker. He was referred for second opinion with us, and we recommended that he have a tilt-table study. Dr. Murray completed this and noted that he had impaired recovery during valsalva. His conclusion was that he potentially could be developing early autonomic dysfunction but recommended to stay well hydrated, etc. He has increased his water intake and thinks his symptoms have improved.  - He has been known to have resting bradycardia since college. He is well conditioned and his bradycardia is attributed to this.   - Feels no exertional limitations and heart monitoring has shown adequate HR distribution.   - Stress echo showed normal cardiac function/structue and no ischemia.   - No current indication for pacing for the time being.   - Symptoms postural and appears to have orthostatic hypotension.       - Use compression shorts (athletic compression shorts)      - Increase hydration with goal to take in 64-72 oz of water/electrolyte fluids per day.       - Liberalize salt/electrolyte intake      - Change positions carefully and slowly and maintain safe environment.       - Standing training and supine isometric exercises.     His orthostatic symptoms have potentially 2 components: cardioinhibitory related to short term chronotropic incompetence, and vasoplegia. During his tilt table test his heart rate appeared to have impaired response. Potentially a pacemaker could help his symptoms but unlikely it would solve the whole issue. For now increasing fluids has seemed to help so will continue monitoring him.    Plan for today:  1. Neurology referral to see if there  is any sort of early signs of early CNS disease / autonomic testing  2. Follow up in 3 months in EP clinic for further assessment and evaluation.    Patient seen and discussed with Dr. Meneses.    Colton Cortez MD PhD  Cardiac Electrophysiology Fellow  P: Text Page        History of Present Illness/Subjective    Mr. Thad Flores is a 62 year old male who comes in today for EP consultation of bradycardia and dizziness.    Mr. Flores is a 62 year old male who has a medical HTN, pre-DM, orthostatism, and nonobstructive CAD.  He is seeking a second opinion for postural dizziness and bradycardia.  He saw EP at [facility] on 3/15/2023 complaining of more progressive dizziness.  His losartan was discontinued due to symptoms of dizziness but not clear that this provided much benefit.  He primarily reported dizziness with postural changes.  He was encouraged to maintain adequate hydration.  He uses his [employer] bike without exertional limitation.  He had ECG at our recent office visit that showed resting heart rate was 37 bpm, SB with 1 AVB.  This prompted a Zio patch monitor in 11/2022 that showed HR 32- 139 with average heart rate 48 bpm, 1 NSVT 4 beats, 2 PSVT up to 8 beats.  He had a stress echo in 1/2023 that was negative for ischemia, good exercise duration and workload, normal function, and no symptoms were elicited.  At [facility] office visit, he reported that he is always had a low resting heart rate attributed to his athletic conditioning.  For the last several years he has had dizziness and lightheadedness with postural changes and had a syncopal episode a few years ago after standing up from a couch.  He noted the episodes were occurring several times a day and not resolving as quickly.  He gets a tingling sensation in his abdomen and feels that his body grows heavier as he stands.  He notes occasionally getting some graying out/tunnel vision but no brayden syncope.  He has maintained excellent hydration per his  report.  He has been worried about his HR as his smart watch shows that he is frequently getting HR in the low 40s and into the high 30s at times.  Apparently PPM was discussed with him at the visit but he had elected to monitor.  He then asked to pursue a second opinion for which he presents today.    He reports feeling at baseline. He was offered a PPM through [employer]. He has had bradycardia since college years. He has postural dizziness at times but no dizziness without changing posture. This was managed with fluids and increased salt intake. Now having worsening orthostatism. He feels best after exercising. HbA1C 6-6.5. He denies chest discomfort, palpitations, abdominal fullness/bloating or peripheral edema, shortness of breath, paroxysmal nocturnal dyspnea, orthopnea, lightheadedness, dizziness, pre-syncope, or syncope. Presenting 12 lead ECG shows SB 1 AVB Vent Rate 40 bpm,  ms,  ms, QTc 414 ms. . Current cardiac medications include: Crestor, Losartan, Zetia, and ASA.     EP Visit 7/12/2023: Patient presents today for follow up. He was referred to Dr. Murray for tilt table testing. Testing was notable for impaired heart rate recovery time. Recommendations following testing were to increase hydration, liberalize salt intake, and wear tight fitting shorts / pants. He has done this and his symptoms are significantly improved. He is still able to exercise--symptoms seem to only be present with leisurely activity (mildly light-headedness), however, if he is intensely biking or swimming he doesn't seem to have any symptoms. No other symptoms--no fevers, chills, chest pain, shortness of breath, abdominal pain, nausea, vomiting, or diarrhea.    I have reviewed and updated the patient's Past Medical History, Social History, Family History and Medication List.     Cardiographics (Personally Reviewed) :   1/3/23 Stress Echo (OSH Report):   Final Impressions:    1. Negative stress echo for ischemia.    2.  Post stress, decreased left ventricular size, increased global systolic function with an estimated EF of 70 to 75%.    3. Maximum stress test with 88.3% of age predicted maximum heart rate achieved.    4. There were no ischemic changes by EKG during stress.    5. Good exercise duration and workload.    6. During stress exam the patient developed no significant symptoms.    7. 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    8. Compared to 6/18/2020 dilated sinuses of valsalva (prior to 3.8 cm).          Physical Examination   There were no vitals taken for this visit.  Wt Readings from Last 3 Encounters:   06/02/23 75.8 kg (167 lb 3.2 oz)   05/03/23 77.4 kg (170 lb 9.6 oz)   01/29/16 78.9 kg (174 lb)     General Appearance:   Alert, well-appearing and in no acute distress.   HEENT: Atraumatic, normocephalic. PERRL.  MMM.   Chest/Lungs:   Respirations unlabored.  Lungs are clear to auscultation.   Cardiovascular:   Regular rate and rhythm.  S1/S2. No murmur.    Abdomen:  Soft, nontender, nondistended.   Extremities: No cyanosis or clubbing. No edema.    Musculoskeletal: Moves all extremities.  Gait normal.   Skin: Warm, dry, intact.    Neurologic: Mood and affect are appropriate.  Alert and oriented to person, place, time, and situation.          Medications  Allergies   Current Outpatient Medications   Medication Sig Dispense Refill     aspirin 81 MG tablet Take 81 mg by mouth daily       Cholecalciferol (VITAMIN D3 PO) Take 2,000 Units by mouth daily       cyanocobalamin (VITAMIN B-12) 1000 MCG sublingual tablet        ezetimibe (ZETIA) 10 MG tablet Take 1 tablet by mouth daily       losartan (COZAAR) 50 MG tablet Take 50 mg by mouth       metFORMIN (GLUCOPHAGE) 500 MG tablet Take 500 mg by mouth       nitroGLYcerin (NITROSTAT) 0.4 MG sublingual tablet        psyllium (METAMUCIL/KONSYL) Packet Take 1 packet by mouth daily       rosuvastatin (CRESTOR) 20 MG tablet Take 20 mg by mouth      Allergies  "  Allergen Reactions     Aspirin Anaphylaxis     Reported rash and anaphylaxis with concomitant use of IBF, tests with allergist did not confirm aspirin allergy.     Ibuprofen Rash     With aspirin use anaphylaxis.     Penicillins      Fascial swelling     Wasp Venom Protein Other (See Comments)     \"extreme swelling\"         Lab Results (Personally Reviewed)    Chemistry/lipid CBC Cardiac Enzymes/BNP/TSH/INR   Lab Results   Component Value Date    BUN 23 01/29/2016     01/29/2016    CO2 30 01/29/2016     Creatinine   Date Value Ref Range Status   01/29/2016 1.07 0.66 - 1.25 mg/dL Final       Lab Results   Component Value Date    CHOL 128 01/29/2016    HDL 67 01/29/2016    LDL 53 01/29/2016      Lab Results   Component Value Date     (A) 11/19/2014    Lab Results   Component Value Date    INR 1.14 06/29/2010        The patient states understanding and is agreeable with the plan.   Carlos Meneses MD [facility]  Cardiology - Electrophysiology    Total time spent on patient visit, reviewing notes, imaging, labs, orders, and completing necessary documentation: 45 minutes.                  "

## 2023-09-10 ENCOUNTER — HEALTH MAINTENANCE LETTER (OUTPATIENT)
Age: 63
End: 2023-09-10

## 2023-09-13 NOTE — PROGRESS NOTES
[facility]  Section of General Neurology  New Patient Visit      Thad Flores MRN# [id number]   Age: 63 year old YOB: 1960              Assessment and Plan:   Assessment:  Maximino Flores is a very pleasant 63 year old man who presents in consultation.  He has had several years of orthostatic dizziness with testing suggestive of orthostatic hypotension.  He also has bradycardia which is a confounding variable.  He was referred to determine if there could be an early neurological process that could explain these symptoms.  We discussed that the most common neurological disease correlated with this is Parkinsons disease and I see no evidence of prodromal Parkinsons symptoms or Parkinsonism in him, good news.  He has a history of dementia in a materal grandfather in his 60s but no other family history.  He is quite sharp himself.  I would say his risk of Parkinsons or developing a dementia is likely that of the general population at this point which we discussed.    He has a probable very slight diabetic neuropathy seen in the small fibers only with some numbness, possible slight temperature dysregulation.  I think these changes are too slight to be detected on an EMG.   We discussed if he wants a more complete neurology autonomic picture that Dr. Bledsoe does this testing at [facility] [place].  There is some overalp between this and what cardiology does but this notable involves sweat testing (he seems to sweat normally subjectively) among other slight differences.   To their questioning I'm not sure what we would find that would be obviously actionable but if further data is desired this could be considered certainly.    Overall I feel his symptoms certainly are well explained by orthostatic hypotension and likely further confounded by his bradycardia.  Discussed future treatment options in florinef, midodrine (I prefer the latter) but his BP is already on the higher side and these would  increase it further.  Continued symptomatic management (moving legs when sitting for long periods, hydration/electrolyte intake/standing slowly) has worked well for him.  He can exercise and live his life mostly as he would like.  Broached them so he would know there are options should this worsen.     My gestalt is that there is not an overarching neurodegenerative cause of his changes which we discussed.  Perhaps a slight role his diabetes could be playing but no subjective note of dysautonomia in the gut e.g. so probably mild if at all related, likewise as above possible neuropathy changes are very mild.       Encouraged him to reach out with any issues questions or changes I would be happy to re-evaluate him.  Likewise if he wants to obtain further neurological autonomic data I can help him facilitate and interpret this data.  He will discuss this with his cardiology team further.  All questions answered.           Giovanny Gutierrez MD   of Neurology   [facility]      History of Presenting Symptoms:   Thad Flores is a 63 year old male who presents today for evaluation of dizziness, h/o bradycardia    Dizziness: Started 3 years, all related to postural changes.   Has fainted 2020, sitting on couch late at night, watching a show, got up.   More consistent now.    Usually worse after coffee and when dehydrated.    Feels best when exercising.    Discussed role of bradycardia.      Early satiety--none, no nausea   Constipation/diarrhea-- no constipation  Bradykinesia--none  Anosmia--no  Neuropathy--tingling in his toes.  About 6 years.  Takes b12.  Not much changes changes.    Sister gets Raynauds.   Feet get blue easily--no, but do get cold more easily all the time.   Previous .    Changes in amount of sweat--no changes in pattern that he knows of.      13 years ago fully detached his hamstring, water skiing.    No clear L/R disparity in his feet.    Grandfather with Alzheimers ~age 67.  Mom age 86 doing well.         Here with his wife Chiquis  They live in [place]  Mostly retired, some consulting,  retired [employer].      Referring cardiology note reviewed  Assessment/Plan:    Mr. Flores is a 62 year old male who has a medical HTN, pre-DM, orthostatism, and nonobstructive CAD.     Bradycardia and Dizziness (early autonomic dysfunction?): Patient was referred for symptom primarily of orthostatic hypotension, which he was recommended to have a pacemaker. He was referred for second opinion with us, and we recommended that he have a tilt-table study. Dr. Murray completed this and noted that he had impaired recovery during valsalva. His conclusion was that he potentially could be developing early autonomic dysfunction but recommended to stay well hydrated, etc. He has increased his water intake and thinks his symptoms have improved.  - He has been known to have resting bradycardia since college. He is well conditioned and his bradycardia is attributed to this.   - Feels no exertional limitations and heart monitoring has shown adequate HR distribution.   - Stress echo showed normal cardiac function/structue and no ischemia.   - No current indication for pacing for the time being.   - Symptoms postural and appears to have orthostatic hypotension.       - Use compression shorts (athletic compression shorts)      - Increase hydration with goal to take in 64-72 oz of water/electrolyte fluids per day.       - Liberalize salt/electrolyte intake      - Change positions carefully and slowly and maintain safe environment.       - Standing training and supine isometric exercises.      His orthostatic symptoms have potentially 2 components: cardioinhibitory related to short term chronotropic incompetence, and vasoplegia. During his tilt table test his heart rate appeared to have impaired response. Potentially a pacemaker could help his symptoms but unlikely it would solve the whole  issue. For now increasing fluids has seemed to help so will continue monitoring him.     Plan for today:  1. Neurology referral to see if there is any sort of early signs of early CNS disease / autonomic testing  2. Follow up in 3 months in EP clinic for further assessment and evaluation.     Patient seen and discussed with Dr. Meneses.     Colton Cortez MD PhD  Cardiac Electrophysiology Fellow      Past Medical History:     Patient Active Problem List   Diagnosis    Diabetes mellitus, type 2 (H)    Elevated glucose    Hyperlipidemia     Past Medical History:   Diagnosis Date    Agatston coronary artery calcium score between 200 and 399 11/2014    361 at [facility]    Anemia 2010    bleeding with hamstring injury    Bradycardia     all of his life, 30's at rest    Elevated glucose 2010    improved with change in health habits    H/O echocardiogram     at [facility]    Heart murmur age 18    Hyperlipidemia 2004    Normal cardiac stress test 2011    at [facility]        Past Surgical History:     Past Surgical History:   Procedure Laterality Date    EP STUDY TILT TABLE N/A 6/2/2023    Procedure: Tilt Table Study;  Surgeon: Arnaldo Murray MD;  Location:  [facility]    NOSE SURGERY  1975    and 1999 reconstruction    pilonidal  2008    REPAIR TENDON HAMSTRING  2010    SURGICAL PATHOLOGY EXAM Left 8/2014        Social History:     Social History     Tobacco Use    Smoking status: Never   Substance Use Topics    Alcohol use: Yes     Alcohol/week: 8.3 standard drinks of alcohol     Types: 10 drink(s) per week        Family History:     Family History   Problem Relation Age of Onset    Hypertension Mother 50        living age 77 in 2014    Lipids Mother 40    Hypertension Father 50    Cancer Father 67        throat, hx of tobacco use    Lipids Brother 48        Medications:     Current Outpatient Medications   Medication Sig    aspirin 81 MG tablet Take 81 mg by mouth daily    Cholecalciferol (VITAMIN D3 PO) Take 2,000  "Units by mouth daily    cyanocobalamin (VITAMIN B-12) 1000 MCG sublingual tablet     ezetimibe (ZETIA) 10 MG tablet Take 1 tablet by mouth daily    losartan (COZAAR) 50 MG tablet Take 50 mg by mouth    metFORMIN (GLUCOPHAGE) 500 MG tablet Take 500 mg by mouth    nitroGLYcerin (NITROSTAT) 0.4 MG sublingual tablet     psyllium (METAMUCIL/KONSYL) Packet Take 1 packet by mouth daily    rosuvastatin (CRESTOR) 20 MG tablet Take 20 mg by mouth     No current facility-administered medications for this visit.        Allergies:     Allergies   Allergen Reactions    Aspirin Anaphylaxis     Reported rash and anaphylaxis with concomitant use of IBF, tests with allergist did not confirm aspirin allergy.    Ibuprofen Rash     With aspirin use anaphylaxis.    Penicillins      Fascial swelling    Wasp Venom Protein Other (See Comments)     \"extreme swelling\"        Review of Systems:   As noted above     Physical Exam:   Vitals: BP (!) 161/85   Pulse (!) 47   Wt 75.8 kg (167 lb)   SpO2 99%   BMI 22.50 kg/m       Neuro:   General Appearance: No apparent distress, well-nourished, well-groomed, pleasant    Mental Status: Alert and oriented to person, place, and time. Speech fluent and comprehension intact. No dysarthria. Quite sharp and eloquent.        Cranial Nerves:   II: Visual fields: normal  III: Pupils: 3 mm, equal, round, reactive to light   III,IV,VI: Extraocular Movements: intact   V: Facial sensation: intact to light touch  VII: Facial strength: intact without asymmetry  VIII: Hearing: intact grossly  IX: Palate: intact   XII: Tongue movement: normal     Motor Exam:   5/5 diffusely     No drift is present. No abnormal movements. Tone is normal throughout.    Sensory: intact to light touch, vibration, PP diminished on toes to PP compared to calves.      Coordination: no dysmetria with finger-to-nose bilaterally    Reflexes: biceps, triceps, brachioradialis, patellar 1+ and ankle jerks trace and symmetric. Toes are " downgoing bilaterally    Gait: normal casual gait, normal stride length, tandem         Data: Pertinent prior to visit       Laboratory:      Lab Results   Component Value Date    A1C 6.1 01/29/2016    A1C 5.9 11/19/2014           The total time of this encounter today amounted to 65 minutes. This time included time spent with the patient, prep work, ordering tests, and performing post visit documentation.

## 2023-09-18 ENCOUNTER — TELEPHONE (OUTPATIENT)
Dept: NEUROLOGY | Facility: CLINIC | Age: 63
End: 2023-09-18
Payer: COMMERCIAL

## 2023-09-19 ENCOUNTER — OFFICE VISIT (OUTPATIENT)
Dept: NEUROLOGY | Facility: CLINIC | Age: 63
End: 2023-09-19
Attending: INTERNAL MEDICINE
Payer: COMMERCIAL

## 2023-09-19 VITALS
SYSTOLIC BLOOD PRESSURE: 161 MMHG | HEART RATE: 47 BPM | OXYGEN SATURATION: 99 % | DIASTOLIC BLOOD PRESSURE: 85 MMHG | WEIGHT: 167 LBS | BODY MASS INDEX: 22.5 KG/M2

## 2023-09-19 DIAGNOSIS — R00.1 BRADYCARDIA: ICD-10-CM

## 2023-09-19 DIAGNOSIS — R55 SYNCOPE, UNSPECIFIED SYNCOPE TYPE: ICD-10-CM

## 2023-09-19 DIAGNOSIS — I95.1 ORTHOSTATIC HYPOTENSION: Primary | ICD-10-CM

## 2023-09-19 PROCEDURE — 99205 OFFICE O/P NEW HI 60 MIN: CPT | Performed by: STUDENT IN AN ORGANIZED HEALTH CARE EDUCATION/TRAINING PROGRAM

## 2023-09-19 NOTE — PATIENT INSTRUCTIONS
[facility] [place] [facility]    Have your team or you guys just reach out with questions

## 2023-09-19 NOTE — LETTER
9/19/2023         RE: Thad Flores  [address] [facility] [address]        Dear Colleague,    Thank you for referring your patient, Thad Flores, to the [facility] NEUROLOGY CLINICS [facility]. Please see a copy of my visit note below.    [facility]  Section of General Neurology  New Patient Visit      Thad Flores MRN# [id number]   Age: 63 year old YOB: 1960              Assessment and Plan:   Assessment:  Maximino Flores is a very pleasant 63 year old man who presents in consultation.  He has had several years of orthostatic dizziness with testing suggestive of orthostatic hypotension.  He also has bradycardia which is a confounding variable.  He was referred to determine if there could be an early neurological process that could explain these symptoms.  We discussed that the most common neurological disease correlated with this is Parkinsons disease and I see no evidence of prodromal Parkinsons symptoms or Parkinsonism in him, good news.  He has a history of dementia in a materal grandfather in his 60s but no other family history.  He is quite sharp himself.  I would say his risk of Parkinsons or developing a dementia is likely that of the general population at this point which we discussed.    He has a probable very slight diabetic neuropathy seen in the small fibers only with some numbness, possible slight temperature dysregulation.  I think these changes are too slight to be detected on an EMG.   We discussed if he wants a more complete neurology autonomic picture that Dr. Bledsoe does this testing at [facility].  There is some overalp between this and what cardiology does but this notable involves sweat testing (he seems to sweat normally subjectively) among other slight differences.   To their questioning I'm not sure what we would find that would be obviously actionable but if further data is desired this could be considered certainly.    Overall I  feel his symptoms certainly are well explained by orthostatic hypotension and likely further confounded by his bradycardia.  Discussed future treatment options in florinef, midodrine (I prefer the latter) but his BP is already on the higher side and these would increase it further.  Continued symptomatic management (moving legs when sitting for long periods, hydration/electrolyte intake/standing slowly) has worked well for him.  He can exercise and live his life mostly as he would like.  Broached them so he would know there are options should this worsen.     My gestalt is that there is not an overarching neurodegenerative cause of his changes which we discussed.  Perhaps a slight role his diabetes could be playing but no subjective note of dysautonomia in the gut e.g. so probably mild if at all related, likewise as above possible neuropathy changes are very mild.       Encouraged him to reach out with any issues questions or changes I would be happy to re-evaluate him.  Likewise if he wants to obtain further neurological autonomic data I can help him facilitate and interpret this data.  He will discuss this with his cardiology team further.  All questions answered.           Giovanny Gutierrez MD   of Neurology   [facility]      History of Presenting Symptoms:   Thad Flores is a 63 year old male who presents today for evaluation of dizziness, h/o bradycardia    Dizziness: Started 3 years, all related to postural changes.   Has fainted 2020, sitting on couch late at night, watching a show, got up.   More consistent now.    Usually worse after coffee and when dehydrated.    Feels best when exercising.    Discussed role of bradycardia.      Early satiety--none, no nausea   Constipation/diarrhea-- no constipation  Bradykinesia--none  Anosmia--no  Neuropathy--tingling in his toes.  About 6 years.  Takes b12.  Not much changes changes.    Sister gets Raynauds.   Feet get  blue easily--no, but do get cold more easily all the time.   Previous .    Changes in amount of sweat--no changes in pattern that he knows of.      13 years ago fully detached his hamstring, water skiing.    No clear L/R disparity in his feet.   Grandfather with Alzheimers ~age 67.  Mom age 86 doing well.         Here with his wife Chiquis  They live in [place]  Mostly retired, some consulting,  retired [employer].      Referring cardiology note reviewed  Assessment/Plan:    Mr. Flores is a 62 year old male who has a medical HTN, pre-DM, orthostatism, and nonobstructive CAD.     Bradycardia and Dizziness (early autonomic dysfunction?): Patient was referred for symptom primarily of orthostatic hypotension, which he was recommended to have a pacemaker. He was referred for second opinion with us, and we recommended that he have a tilt-table study. Dr. Murray completed this and noted that he had impaired recovery during valsalva. His conclusion was that he potentially could be developing early autonomic dysfunction but recommended to stay well hydrated, etc. He has increased his water intake and thinks his symptoms have improved.  - He has been known to have resting bradycardia since college. He is well conditioned and his bradycardia is attributed to this.   - Feels no exertional limitations and heart monitoring has shown adequate HR distribution.   - Stress echo showed normal cardiac function/structue and no ischemia.   - No current indication for pacing for the time being.   - Symptoms postural and appears to have orthostatic hypotension.       - Use compression shorts (athletic compression shorts)      - Increase hydration with goal to take in 64-72 oz of water/electrolyte fluids per day.       - Liberalize salt/electrolyte intake      - Change positions carefully and slowly and maintain safe environment.       - Standing training and supine isometric exercises.      His orthostatic symptoms have potentially 2  components: cardioinhibitory related to short term chronotropic incompetence, and vasoplegia. During his tilt table test his heart rate appeared to have impaired response. Potentially a pacemaker could help his symptoms but unlikely it would solve the whole issue. For now increasing fluids has seemed to help so will continue monitoring him.     Plan for today:  1. Neurology referral to see if there is any sort of early signs of early CNS disease / autonomic testing  2. Follow up in 3 months in EP clinic for further assessment and evaluation.     Patient seen and discussed with Dr. Meneses.     Colton Cortez MD PhD  Cardiac Electrophysiology Fellow      Past Medical History:     Patient Active Problem List   Diagnosis     Diabetes mellitus, type 2 (H)     Elevated glucose     Hyperlipidemia     Past Medical History:   Diagnosis Date     Agatston coronary artery calcium score between 200 and 399 11/2014    361 at [place]     Anemia 2010    bleeding with hamstring injury     Bradycardia     all of his life, 30's at rest     Elevated glucose 2010    improved with change in health habits     H/O echocardiogram     at [place]     Heart murmur age 18     Hyperlipidemia 2004     Normal cardiac stress test 2011    at [place]        Past Surgical History:     Past Surgical History:   Procedure Laterality Date     EP STUDY TILT TABLE N/A 6/2/2023    Procedure: Tilt Table Study;  Surgeon: Arnaldo Murray MD;  Location:  HEART CARDIAC CATH LAB     NOSE SURGERY  1975    and 1999 reconstruction     pilonidal  2008     REPAIR TENDON HAMSTRING  2010     SURGICAL PATHOLOGY EXAM Left 8/2014        Social History:     Social History     Tobacco Use     Smoking status: Never   Substance Use Topics     Alcohol use: Yes     Alcohol/week: 8.3 standard drinks of alcohol     Types: 10 drink(s) per week        Family History:     Family History   Problem Relation Age of Onset     Hypertension Mother 50        living age 77 in 2014     Lipids  "Mother 40     Hypertension Father 50     Cancer Father 67        throat, hx of tobacco use     Lipids Brother 48        Medications:     Current Outpatient Medications   Medication Sig     aspirin 81 MG tablet Take 81 mg by mouth daily     Cholecalciferol (VITAMIN D3 PO) Take 2,000 Units by mouth daily     cyanocobalamin (VITAMIN B-12) 1000 MCG sublingual tablet      ezetimibe (ZETIA) 10 MG tablet Take 1 tablet by mouth daily     losartan (COZAAR) 50 MG tablet Take 50 mg by mouth     metFORMIN (GLUCOPHAGE) 500 MG tablet Take 500 mg by mouth     nitroGLYcerin (NITROSTAT) 0.4 MG sublingual tablet      psyllium (METAMUCIL/KONSYL) Packet Take 1 packet by mouth daily     rosuvastatin (CRESTOR) 20 MG tablet Take 20 mg by mouth     No current facility-administered medications for this visit.        Allergies:     Allergies   Allergen Reactions     Aspirin Anaphylaxis     Reported rash and anaphylaxis with concomitant use of IBF, tests with allergist did not confirm aspirin allergy.     Ibuprofen Rash     With aspirin use anaphylaxis.     Penicillins      Fascial swelling     Wasp Venom Protein Other (See Comments)     \"extreme swelling\"        Review of Systems:   As noted above     Physical Exam:   Vitals: BP (!) 161/85   Pulse (!) 47   Wt 75.8 kg (167 lb)   SpO2 99%   BMI 22.50 kg/m       Neuro:   General Appearance: No apparent distress, well-nourished, well-groomed, pleasant    Mental Status: Alert and oriented to person, place, and time. Speech fluent and comprehension intact. No dysarthria. Quite sharp and eloquent.        Cranial Nerves:   II: Visual fields: normal  III: Pupils: 3 mm, equal, round, reactive to light   III,IV,VI: Extraocular Movements: intact   V: Facial sensation: intact to light touch  VII: Facial strength: intact without asymmetry  VIII: Hearing: intact grossly  IX: Palate: intact   XII: Tongue movement: normal     Motor Exam:   5/5 diffusely     No drift is present. No abnormal movements. " Tone is normal throughout.    Sensory: intact to light touch, vibration, PP diminished on toes to PP compared to calves.      Coordination: no dysmetria with finger-to-nose bilaterally    Reflexes: biceps, triceps, brachioradialis, patellar 1+ and ankle jerks trace and symmetric. Toes are downgoing bilaterally    Gait: normal casual gait, normal stride length, tandem         Data: Pertinent prior to visit       Laboratory:      Lab Results   Component Value Date    A1C 6.1 01/29/2016    A1C 5.9 11/19/2014           The total time of this encounter today amounted to 65 minutes. This time included time spent with the patient, prep work, ordering tests, and performing post visit documentation.      Again, thank you for allowing me to participate in the care of your patient.        Sincerely,        Bo Gutierrez MD

## 2023-09-19 NOTE — NURSING NOTE
"Thad Flores is a 63 year old male who presents for:  Chief Complaint   Patient presents with    Referral     Dizziness; Bradycarida        Initial Vitals:  BP (!) 161/85   Pulse (!) 47   Wt 75.8 kg (167 lb)   SpO2 99%   BMI 22.50 kg/m   Estimated body mass index is 22.5 kg/m  as calculated from the following:    Height as of 7/12/23: 1.835 m (6' 0.24\").    Weight as of this encounter: 75.8 kg (167 lb).. Body surface area is 1.97 meters squared. BP completed using cuff size: alfredo Chapman    "

## 2023-10-25 ENCOUNTER — OFFICE VISIT (OUTPATIENT)
Dept: CARDIOLOGY | Facility: CLINIC | Age: 63
End: 2023-10-25
Attending: INTERNAL MEDICINE
Payer: COMMERCIAL

## 2023-10-25 VITALS
HEART RATE: 41 BPM | WEIGHT: 170 LBS | BODY MASS INDEX: 23.03 KG/M2 | HEIGHT: 72 IN | SYSTOLIC BLOOD PRESSURE: 138 MMHG | OXYGEN SATURATION: 100 % | DIASTOLIC BLOOD PRESSURE: 83 MMHG

## 2023-10-25 DIAGNOSIS — R55 POSTURAL DIZZINESS WITH PRESYNCOPE: ICD-10-CM

## 2023-10-25 DIAGNOSIS — R00.1 BRADYCARDIA: ICD-10-CM

## 2023-10-25 DIAGNOSIS — R42 POSTURAL DIZZINESS WITH PRESYNCOPE: ICD-10-CM

## 2023-10-25 PROCEDURE — G0463 HOSPITAL OUTPT CLINIC VISIT: HCPCS | Performed by: INTERNAL MEDICINE

## 2023-10-25 PROCEDURE — 99214 OFFICE O/P EST MOD 30 MIN: CPT | Mod: GC | Performed by: INTERNAL MEDICINE

## 2023-10-25 ASSESSMENT — PAIN SCALES - GENERAL: PAINLEVEL: NO PAIN (0)

## 2023-10-25 NOTE — LETTER
10/25/2023      RE: Thad Flores  [address]  [facility] [address]       Dear Colleague,    Thank you for the opportunity to participate in the care of your patient, Thad Flores, at the [facility] HEART CLINIC [place] at . Please see a copy of my visit note below.        ELECTROPHYSIOLOGY CLINIC VISIT    Assessment/Recommendations   Assessment/Plan:    Mr. Flores is a 63 year old male who has a medical HTN, pre-DM, orthostatism, and nonobstructive CAD.    Bradycardia and Dizziness: Patient was referred for symptom primarily of orthostatic hypotension, which he was recommended to have a pacemaker. He was referred for second opinion with us, and we recommended that he have a tilt-table study. Dr. Murray completed this and noted that he had impaired recovery during valsalva. His conclusion was that he potentially could be developing early autonomic dysfunction but recommended to stay well hydrated, etc. He has increased his water intake and thinks his symptoms have improved. Unfortunately, after his recent trip from [place] (has been back less than a week) he thinks his symptoms have worsened. He was evaluated by Neurology who noted that more testing could be done but didn't feel strongly about pursuing it and did not think that he had elevated risk of neurological disease.  - He has been known to have resting bradycardia since college. He is well conditioned and his bradycardia is attributed to this.   - Feels no exertional limitations and heart monitoring has shown adequate HR distribution.   - Stress echo showed normal cardiac function/structue and no ischemia.   - No current indication for pacing for the time being.   - Symptoms postural and appears to have orthostatic hypotension.       - Use compression shorts (athletic compression shorts)      - Increase hydration with goal to take in 64-72 oz of water/electrolyte fluids per day.       -  Liberalize salt/electrolyte intake      - Change positions carefully and slowly and maintain safe environment.       - Standing training and supine isometric exercises.     His orthostatic symptoms have potentially 2 components: cardioinhibitory related to short term chronotropic incompetence, and vasoplegia. During his tilt table test his heart rate appeared to have impaired response. Potentially a pacemaker could help his symptoms but unlikely it would solve the whole issue. Patient symptoms seem to have worsened with his recent [place] trip but for now we will allow him to recover  from his trip and will increase his overall fluid balance by decreasing coffee intake.    Plan for today:  1. Will continue to monitor patient and follow up in 6 months.    Patient seen and discussed with Dr. Meneses.    Colton Cortez MD PhD  Cardiac Electrophysiology Fellow  P: Text Page        History of Present Illness/Subjective    Mr. Thad Flores is a 62 year old male who comes in today for EP consultation of bradycardia and dizziness.    Mr. Flores is a 62 year old male who has a medical HTN, pre-DM, orthostatism, and nonobstructive CAD.  He is seeking a second opinion for postural dizziness and bradycardia.  He saw EP at [facility] on 3/15/2023 complaining of more progressive dizziness.  His losartan was discontinued due to symptoms of dizziness but not clear that this provided much benefit.  He primarily reported dizziness with postural changes.  He was encouraged to maintain adequate hydration.  He uses his [employer] bike without exertional limitation.  He had ECG at our recent office visit that showed resting heart rate was 37 bpm, SB with 1 AVB.  This prompted a Zio patch monitor in 11/2022 that showed HR 32- 139 with average heart rate 48 bpm, 1 NSVT 4 beats, 2 PSVT up to 8 beats.  He had a stress echo in 1/2023 that was negative for ischemia, good exercise duration and workload, normal function, and no symptoms were  elicited.  At [facility] office visit, he reported that he is always had a low resting heart rate attributed to his athletic conditioning.  For the last several years he has had dizziness and lightheadedness with postural changes and had a syncopal episode a few years ago after standing up from a couch.  He noted the episodes were occurring several times a day and not resolving as quickly.  He gets a tingling sensation in his abdomen and feels that his body grows heavier as he stands.  He notes occasionally getting some graying out/tunnel vision but no brayden syncope.  He has maintained excellent hydration per his report.  He has been worried about his HR as his smart watch shows that he is frequently getting HR in the low 40s and into the high 30s at times.  Apparently PPM was discussed with him at the visit but he had elected to monitor.  He then asked to pursue a second opinion for which he presents today.    He reports feeling at baseline. He was offered a PPM through [facility]. He has had bradycardia since college years. He has postural dizziness at times but no dizziness without changing posture. This was managed with fluids and increased salt intake. Now having worsening orthostatism. He feels best after exercising. HbA1C 6-6.5. He denies chest discomfort, palpitations, abdominal fullness/bloating or peripheral edema, shortness of breath, paroxysmal nocturnal dyspnea, orthopnea, lightheadedness, dizziness, pre-syncope, or syncope. Presenting 12 lead ECG shows SB 1 AVB Vent Rate 40 bpm,  ms,  ms, QTc 414 ms. . Current cardiac medications include: Crestor, Losartan, Zetia, and ASA.     EP Visit 7/12/2023: Patient presents today for follow up. He was referred to Dr. Murray for tilt table testing. Testing was notable for impaired heart rate recovery time. Recommendations following testing were to increase hydration, liberalize salt intake, and wear tight fitting shorts / pants. He has done this and his  symptoms are significantly improved. He is still able to exercise--symptoms seem to only be present with leisurely activity (mildly light-headedness), however, if he is intensely biking or swimming he doesn't seem to have any symptoms. No other symptoms--no fevers, chills, chest pain, shortness of breath, abdominal pain, nausea, vomiting, or diarrhea.    EP Visit 10/25/2023: Patient presents today for follow up. He just got back from a 12 day trip to [place] and he was able to complete all activities without any sort of limitation. He notes though that he had intermittent dizziness and light-headedness that seem to be worse during the trip and after he returned home. He notes that the symptoms have worsened, but he is still able to exercise without difficulty. He saw neurology who did not think there was an underlying neurological process playing a role. Aside from this he is doing well. No fevers, chills, chest pain, SOB, abdominal pain, nausea, vomiting, or diarrhea.    I have reviewed and updated the patient's Past Medical History, Social History, Family History and Medication List.     Cardiographics (Personally Reviewed) :   1/3/23 Stress Echo (OSH Report):   Final Impressions:    1. Negative stress echo for ischemia.    2. Post stress, decreased left ventricular size, increased global systolic function with an estimated EF of 70 to 75%.    3. Maximum stress test with 88.3% of age predicted maximum heart rate achieved.    4. There were no ischemic changes by EKG during stress.    5. Good exercise duration and workload.    6. During stress exam the patient developed no significant symptoms.    7. 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    8. Compared to 6/18/2020 dilated sinuses of valsalva (prior to 3.8 cm).          Physical Examination   There were no vitals taken for this visit.  Wt Readings from Last 3 Encounters:   09/19/23 75.8 kg (167 lb)   07/12/23 77.1 kg (170 lb)   06/02/23 75.8  "kg (167 lb 3.2 oz)     General Appearance:   Alert, well-appearing and in no acute distress.   HEENT: Atraumatic, normocephalic. PERRL.  MMM.   Chest/Lungs:   Respirations unlabored.  Lungs are clear to auscultation.   Cardiovascular:   Regular rate and rhythm.  S1/S2. No murmur.    Abdomen:  Soft, nontender, nondistended.   Extremities: No cyanosis or clubbing. No edema.    Musculoskeletal: Moves all extremities.  Gait normal.   Skin: Warm, dry, intact.    Neurologic: Mood and affect are appropriate.  Alert and oriented to person, place, time, and situation.          Medications  Allergies   Current Outpatient Medications   Medication Sig Dispense Refill    aspirin 81 MG tablet Take 81 mg by mouth daily      Cholecalciferol (VITAMIN D3 PO) Take 2,000 Units by mouth daily      cyanocobalamin (VITAMIN B-12) 1000 MCG sublingual tablet       ezetimibe (ZETIA) 10 MG tablet Take 1 tablet by mouth daily      losartan (COZAAR) 50 MG tablet Take 50 mg by mouth      metFORMIN (GLUCOPHAGE) 500 MG tablet Take 500 mg by mouth      nitroGLYcerin (NITROSTAT) 0.4 MG sublingual tablet       psyllium (METAMUCIL/KONSYL) Packet Take 1 packet by mouth daily      rosuvastatin (CRESTOR) 20 MG tablet Take 20 mg by mouth      Allergies   Allergen Reactions    Aspirin Anaphylaxis     Reported rash and anaphylaxis with concomitant use of IBF, tests with allergist did not confirm aspirin allergy.    Ibuprofen Rash     With aspirin use anaphylaxis.    Penicillins      Fascial swelling    Wasp Venom Protein Other (See Comments)     \"extreme swelling\"         Lab Results (Personally Reviewed)    Chemistry/lipid CBC Cardiac Enzymes/BNP/TSH/INR   Lab Results   Component Value Date    BUN 23 01/29/2016     01/29/2016    CO2 30 01/29/2016     Creatinine   Date Value Ref Range Status   01/29/2016 1.07 0.66 - 1.25 mg/dL Final       Lab Results   Component Value Date    CHOL 128 01/29/2016    HDL 67 01/29/2016    LDL 53 01/29/2016      Lab " Results   Component Value Date     (A) 11/19/2014    Lab Results   Component Value Date    INR 1.14 06/29/2010        EP STAFF NOTE  Patient seen and examined and management plan personally reviewed with the patient. I agree with the note above by the CV/EP fellow.  Carlos Meneses MD [facility]  Cardiology - Electrophysiology      Total time spent on patient visit, reviewing notes, imaging, labs, orders, and completing necessary documentation: 30 minutes.

## 2023-10-25 NOTE — PROGRESS NOTES
ELECTROPHYSIOLOGY CLINIC VISIT    Assessment/Recommendations   Assessment/Plan:    Mr. Flores is a 63 year old male who has a medical HTN, pre-DM, orthostatism, and nonobstructive CAD.    Bradycardia and Dizziness: Patient was referred for symptom primarily of orthostatic hypotension, which he was recommended to have a pacemaker. He was referred for second opinion with us, and we recommended that he have a tilt-table study. Dr. Murray completed this and noted that he had impaired recovery during valsalva. His conclusion was that he potentially could be developing early autonomic dysfunction but recommended to stay well hydrated, etc. He has increased his water intake and thinks his symptoms have improved. Unfortunately, after his recent trip from [place] (has been back less than a week) he thinks his symptoms have worsened. He was evaluated by Neurology who noted that more testing could be done but didn't feel strongly about pursuing it and did not think that he had elevated risk of neurological disease.  - He has been known to have resting bradycardia since college. He is well conditioned and his bradycardia is attributed to this.   - Feels no exertional limitations and heart monitoring has shown adequate HR distribution.   - Stress echo showed normal cardiac function/structue and no ischemia.   - No current indication for pacing for the time being.   - Symptoms postural and appears to have orthostatic hypotension.       - Use compression shorts (athletic compression shorts)      - Increase hydration with goal to take in 64-72 oz of water/electrolyte fluids per day.       - Liberalize salt/electrolyte intake      - Change positions carefully and slowly and maintain safe environment.       - Standing training and supine isometric exercises.     His orthostatic symptoms have potentially 2 components: cardioinhibitory related to short term chronotropic incompetence, and vasoplegia. During his tilt table test his  heart rate appeared to have impaired response. Potentially a pacemaker could help his symptoms but unlikely it would solve the whole issue. Patient symptoms seem to have worsened with his recent [place] trip but for now we will allow him to recover  from his trip and will increase his overall fluid balance by decreasing coffee intake.    Plan for today:  1. Will continue to monitor patient and follow up in 6 months.    Patient seen and discussed with Dr. Meneses.    Colton Cortez MD PhD  Cardiac Electrophysiology Fellow  P: Text Page        History of Present Illness/Subjective    Mr. Thad Flores is a 62 year old male who comes in today for EP consultation of bradycardia and dizziness.    Mr. Flores is a 62 year old male who has a medical HTN, pre-DM, orthostatism, and nonobstructive CAD.  He is seeking a second opinion for postural dizziness and bradycardia.  He saw EP at [facility] on 3/15/2023 complaining of more progressive dizziness.  His losartan was discontinued due to symptoms of dizziness but not clear that this provided much benefit.  He primarily reported dizziness with postural changes.  He was encouraged to maintain adequate hydration.  He uses his Peloton bike without exertional limitation.  He had ECG at our recent office visit that showed resting heart rate was 37 bpm, SB with 1 AVB.  This prompted a Zio patch monitor in 11/2022 that showed HR 32- 139 with average heart rate 48 bpm, 1 NSVT 4 beats, 2 PSVT up to 8 beats.  He had a stress echo in 1/2023 that was negative for ischemia, good exercise duration and workload, normal function, and no symptoms were elicited.  At [facility] office visit, he reported that he is always had a low resting heart rate attributed to his athletic conditioning.  For the last several years he has had dizziness and lightheadedness with postural changes and had a syncopal episode a few years ago after standing up from a couch.  He noted the episodes were occurring several  times a day and not resolving as quickly.  He gets a tingling sensation in his abdomen and feels that his body grows heavier as he stands.  He notes occasionally getting some graying out/tunnel vision but no brayden syncope.  He has maintained excellent hydration per his report.  He has been worried about his HR as his smart watch shows that he is frequently getting HR in the low 40s and into the high 30s at times.  Apparently PPM was discussed with him at the visit but he had elected to monitor.  He then asked to pursue a second opinion for which he presents today.    He reports feeling at baseline. He was offered a PPM through [employer]. He has had bradycardia since college years. He has postural dizziness at times but no dizziness without changing posture. This was managed with fluids and increased salt intake. Now having worsening orthostatism. He feels best after exercising. HbA1C 6-6.5. He denies chest discomfort, palpitations, abdominal fullness/bloating or peripheral edema, shortness of breath, paroxysmal nocturnal dyspnea, orthopnea, lightheadedness, dizziness, pre-syncope, or syncope. Presenting 12 lead ECG shows SB 1 AVB Vent Rate 40 bpm,  ms,  ms, QTc 414 ms. . Current cardiac medications include: Crestor, Losartan, Zetia, and ASA.     EP Visit 7/12/2023: Patient presents today for follow up. He was referred to Dr. Murray for tilt table testing. Testing was notable for impaired heart rate recovery time. Recommendations following testing were to increase hydration, liberalize salt intake, and wear tight fitting shorts / pants. He has done this and his symptoms are significantly improved. He is still able to exercise--symptoms seem to only be present with leisurely activity (mildly light-headedness), however, if he is intensely biking or swimming he doesn't seem to have any symptoms. No other symptoms--no fevers, chills, chest pain, shortness of breath, abdominal pain, nausea, vomiting, or  diarrhea.    EP Visit 10/25/2023: Patient presents today for follow up. He just got back from a 12 day trip to [place] and he was able to complete all activities without any sort of limitation. He notes though that he had intermittent dizziness and light-headedness that seem to be worse during the trip and after he returned home. He notes that the symptoms have worsened, but he is still able to exercise without difficulty. He saw neurology who did not think there was an underlying neurological process playing a role. Aside from this he is doing well. No fevers, chills, chest pain, SOB, abdominal pain, nausea, vomiting, or diarrhea.    I have reviewed and updated the patient's Past Medical History, Social History, Family History and Medication List.     Cardiographics (Personally Reviewed) :   1/3/23 Stress Echo (OSH Report):   Final Impressions:    1. Negative stress echo for ischemia.    2. Post stress, decreased left ventricular size, increased global systolic function with an estimated EF of 70 to 75%.    3. Maximum stress test with 88.3% of age predicted maximum heart rate achieved.    4. There were no ischemic changes by EKG during stress.    5. Good exercise duration and workload.    6. During stress exam the patient developed no significant symptoms.    7. 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    8. Compared to 6/18/2020 dilated sinuses of valsalva (prior to 3.8 cm).          Physical Examination   There were no vitals taken for this visit.  Wt Readings from Last 3 Encounters:   09/19/23 75.8 kg (167 lb)   07/12/23 77.1 kg (170 lb)   06/02/23 75.8 kg (167 lb 3.2 oz)     General Appearance:   Alert, well-appearing and in no acute distress.   HEENT: Atraumatic, normocephalic. PERRL.  MMM.   Chest/Lungs:   Respirations unlabored.  Lungs are clear to auscultation.   Cardiovascular:   Regular rate and rhythm.  S1/S2. No murmur.    Abdomen:  Soft, nontender, nondistended.   Extremities: No  "cyanosis or clubbing. No edema.    Musculoskeletal: Moves all extremities.  Gait normal.   Skin: Warm, dry, intact.    Neurologic: Mood and affect are appropriate.  Alert and oriented to person, place, time, and situation.          Medications  Allergies   Current Outpatient Medications   Medication Sig Dispense Refill    aspirin 81 MG tablet Take 81 mg by mouth daily      Cholecalciferol (VITAMIN D3 PO) Take 2,000 Units by mouth daily      cyanocobalamin (VITAMIN B-12) 1000 MCG sublingual tablet       ezetimibe (ZETIA) 10 MG tablet Take 1 tablet by mouth daily      losartan (COZAAR) 50 MG tablet Take 50 mg by mouth      metFORMIN (GLUCOPHAGE) 500 MG tablet Take 500 mg by mouth      nitroGLYcerin (NITROSTAT) 0.4 MG sublingual tablet       psyllium (METAMUCIL/KONSYL) Packet Take 1 packet by mouth daily      rosuvastatin (CRESTOR) 20 MG tablet Take 20 mg by mouth      Allergies   Allergen Reactions    Aspirin Anaphylaxis     Reported rash and anaphylaxis with concomitant use of IBF, tests with allergist did not confirm aspirin allergy.    Ibuprofen Rash     With aspirin use anaphylaxis.    Penicillins      Fascial swelling    Wasp Venom Protein Other (See Comments)     \"extreme swelling\"         Lab Results (Personally Reviewed)    Chemistry/lipid CBC Cardiac Enzymes/BNP/TSH/INR   Lab Results   Component Value Date    BUN 23 01/29/2016     01/29/2016    CO2 30 01/29/2016     Creatinine   Date Value Ref Range Status   01/29/2016 1.07 0.66 - 1.25 mg/dL Final       Lab Results   Component Value Date    CHOL 128 01/29/2016    HDL 67 01/29/2016    LDL 53 01/29/2016      Lab Results   Component Value Date     (A) 11/19/2014    Lab Results   Component Value Date    INR 1.14 06/29/2010        EP STAFF NOTE  Patient seen and examined and management plan personally reviewed with the patient. I agree with the note above by the CV/EP fellow.  Carlos Meneses MD [facility]  Cardiology - Electrophysiology      Total time " spent on patient visit, reviewing notes, imaging, labs, orders, and completing necessary documentation: 30 minutes.

## 2023-10-25 NOTE — PATIENT INSTRUCTIONS
Plan:    Follow-up in 6 months      Your Care Team:  EP Cardiology   Telephone Number     Adelina Marks RN [phone]    After business hours: [phone], ask for cardiologist on-call   Non-procedure scheduling:    Chrissy SANTANA   [phone]   Procedure scheduling:    Shelby Patel   [phone]   Device Clinic (Pacemakers, ICDs, Loop Recorders)    During business hours: [phone]  After business hours:   [phone]- select option 4 and ask for job code [id number].       Cardiovascular Clinic:   [address] [place] [address]. [place] [address]      As always, thank you for trusting us with your health care needs!

## 2023-10-25 NOTE — NURSING NOTE
Chief Complaint   Patient presents with    Follow Up     3 mo follow-up postural dizziness, bradycardia. Review neuro note from 9/19.       Vitals were taken, medications reconciled.    Marcio Buenrostro, Facilitator   7:37 AM

## 2024-04-07 ENCOUNTER — HEALTH MAINTENANCE LETTER (OUTPATIENT)
Age: 64
End: 2024-04-07

## 2024-04-30 NOTE — PROGRESS NOTES
ELECTROPHYSIOLOGY CLINIC VISIT    Assessment/Recommendations   Assessment/Plan:    Mr. Flores is a 63 year old male who has a medical HTN, DMII, orthostatic hypotension and nonobstructive CAD.    Bradycardia and Dizziness: Patient was referred for symptom primarily of orthostatic hypotension, which he was recommended to have a pacemaker. He was referred for second opinion with us, and we recommended that he have a tilt-table study. Dr. Murray completed this and noted that he had impaired recovery during valsalva. His conclusion was that he potentially could be developing early autonomic dysfunction but recommended to stay well hydrated, etc. His orthostatic symptoms have potentially 2 components: cardioinhibitory related to short term chronotropic incompetence, and vasoplegia. During his tilt table test his heart rate appeared to have impaired response. Potentially a pacemaker could help his symptoms but unlikely it would solve the whole issue.  He has increased his water intake and electrolyte intake and his symptoms have improved. He is also doing isometric exercises.   - He has been known to have resting bradycardia since college. He is well conditioned and his bradycardia is attributed to this.   - Feels no exertional limitations and heart monitoring has shown adequate HR distribution.   - Stress echo showed normal cardiac function/structue and no ischemia.   - No current indication for pacing for the time being.   - Symptoms postural and appears to have orthostatic hypotension.       - Use compression shorts (athletic compression shorts)      - Increase hydration with goal to take in 64-72 oz of water/electrolyte fluids per day.       - Liberalize salt/electrolyte intake      - Change positions carefully and slowly and maintain safe environment.       - Standing training and supine isometric exercises.     Plan for today:  1. Will continue to monitor patient and follow up in 12 months.         History of  Present Illness/Subjective    Mr. Thad Flores is a 62 year old male who comes in today for EP consultation of bradycardia and dizziness.    Mr. Flores is a 62 year old male who has a medical HTN, pre-DM, orthostatism, and nonobstructive CAD.  He is seeking a second opinion for postural dizziness and bradycardia.  He saw EP at [facility] on 3/15/2023 complaining of more progressive dizziness.  His losartan was discontinued due to symptoms of dizziness but not clear that this provided much benefit.  He primarily reported dizziness with postural changes.  He was encouraged to maintain adequate hydration.  He uses his Peloton bike without exertional limitation.  He had ECG at our recent office visit that showed resting heart rate was 37 bpm, SB with 1 AVB.  This prompted a Zio patch monitor in 11/2022 that showed HR 32- 139 with average heart rate 48 bpm, 1 NSVT 4 beats, 2 PSVT up to 8 beats.  He had a stress echo in 1/2023 that was negative for ischemia, good exercise duration and workload, normal function, and no symptoms were elicited.  At [facility] office visit, he reported that he is always had a low resting heart rate attributed to his athletic conditioning.  For the last several years he has had dizziness and lightheadedness with postural changes and had a syncopal episode a few years ago after standing up from a couch.  He noted the episodes were occurring several times a day and not resolving as quickly.  He gets a tingling sensation in his abdomen and feels that his body grows heavier as he stands.  He notes occasionally getting some graying out/tunnel vision but no brayden syncope.  He has maintained excellent hydration per his report.  He has been worried about his HR as his smart watch shows that he is frequently getting HR in the low 40s and into the high 30s at times.  Apparently PPM was discussed with him at the visit but he had elected to monitor.  He then asked to pursue a second opinion for which he  presents today.    He reports feeling at baseline. He was offered a PPM through [employer]. He has had bradycardia since college years. He has postural dizziness at times but no dizziness without changing posture. This was managed with fluids and increased salt intake. Now having worsening orthostatism. He feels best after exercising. HbA1C 6-6.5. He denies chest discomfort, palpitations, abdominal fullness/bloating or peripheral edema, shortness of breath, paroxysmal nocturnal dyspnea, orthopnea, lightheadedness, dizziness, pre-syncope, or syncope. Presenting 12 lead ECG shows SB 1 AVB Vent Rate 40 bpm,  ms,  ms, QTc 414 ms. . Current cardiac medications include: Crestor, Losartan, Zetia, and ASA.     EP Visit 7/12/2023: Patient presents today for follow up. He was referred to Dr. Murray for tilt table testing. Testing was notable for impaired heart rate recovery time. Recommendations following testing were to increase hydration, liberalize salt intake, and wear tight fitting shorts / pants. He has done this and his symptoms are significantly improved. He is still able to exercise--symptoms seem to only be present with leisurely activity (mildly light-headedness), however, if he is intensely biking or swimming he doesn't seem to have any symptoms. No other symptoms--no fevers, chills, chest pain, shortness of breath, abdominal pain, nausea, vomiting, or diarrhea.    EP Visit 10/25/2023: Patient presents today for follow up. He just got back from a 12 day trip to [place] and he was able to complete all activities without any sort of limitation. He notes though that he had intermittent dizziness and light-headedness that seem to be worse during the trip and after he returned home. He notes that the symptoms have worsened, but he is still able to exercise without difficulty. He saw neurology who did not think there was an underlying neurological process playing a role. Aside from this he is doing well. No  fevers, chills, chest pain, SOB, abdominal pain, nausea, vomiting, or diarrhea.    EP visit 5/1/2024: He presents today for follow-up.  Over the winter he traveled to [place] to go skiing and also spent time in [place].  He notes marked improvement of symptoms while traveling.  He has been drinking increased water as well as electrolytes.  Also doing isometric exercises.  Overall feels much improved with regards to symptoms of lightheadedness.  Notes he still gets symptoms usually few times a day however they are mild.  No syncope or presyncope. No fevers, chills, chest pain, SOB, abdominal pain, nausea, vomiting, or diarrhea.    I have reviewed and updated the patient's Past Medical History, Social History, Family History and Medication List.     Cardiographics (Personally Reviewed) :   1/3/23 Stress Echo (OSH Report):   Final Impressions:    1. Negative stress echo for ischemia.    2. Post stress, decreased left ventricular size, increased global systolic function with an estimated EF of 70 to 75%.    3. Maximum stress test with 88.3% of age predicted maximum heart rate achieved.    4. There were no ischemic changes by EKG during stress.    5. Good exercise duration and workload.    6. During stress exam the patient developed no significant symptoms.    7. 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    8. Compared to 6/18/2020 dilated sinuses of valsalva (prior to 3.8 cm).          Physical Examination   There were no vitals taken for this visit.  Wt Readings from Last 3 Encounters:   10/25/23 77.1 kg (170 lb)   09/19/23 75.8 kg (167 lb)   07/12/23 77.1 kg (170 lb)     General Appearance:   Alert, well-appearing and in no acute distress.   HEENT: Atraumatic, normocephalic. PERRL.  MMM.   Chest/Lungs:   Respirations unlabored.  Lungs are clear to auscultation.   Cardiovascular:   Regular rate and rhythm.  S1/S2. No murmur.    Abdomen:  Soft, nontender, nondistended.   Extremities: No cyanosis or  "clubbing. No edema.    Musculoskeletal: Moves all extremities.  Gait normal.   Skin: Warm, dry, intact.    Neurologic: Mood and affect are appropriate.  Alert and oriented to person, place, time, and situation.          Medications  Allergies   Current Outpatient Medications   Medication Sig Dispense Refill    aspirin 81 MG tablet Take 81 mg by mouth daily      Cholecalciferol (VITAMIN D3 PO) Take 2,000 Units by mouth daily      cyanocobalamin (VITAMIN B-12) 1000 MCG sublingual tablet       ezetimibe (ZETIA) 10 MG tablet Take 1 tablet by mouth daily      losartan (COZAAR) 50 MG tablet Take 50 mg by mouth      metFORMIN (GLUCOPHAGE) 500 MG tablet Take 500 mg by mouth      nitroGLYcerin (NITROSTAT) 0.4 MG sublingual tablet       psyllium (METAMUCIL/KONSYL) Packet Take 1 packet by mouth daily      rosuvastatin (CRESTOR) 20 MG tablet Take 20 mg by mouth      Allergies   Allergen Reactions    Aspirin Anaphylaxis     Reported rash and anaphylaxis with concomitant use of IBF, tests with allergist did not confirm aspirin allergy.    Ibuprofen Rash     With aspirin use anaphylaxis.    Penicillins      Fascial swelling    Wasp Venom Protein Other (See Comments)     \"extreme swelling\"         Lab Results (Personally Reviewed)    Chemistry/lipid CBC Cardiac Enzymes/BNP/TSH/INR   Lab Results   Component Value Date    BUN 23 01/29/2016     01/29/2016    CO2 30 01/29/2016     Creatinine   Date Value Ref Range Status   01/29/2016 1.07 0.66 - 1.25 mg/dL Final       Lab Results   Component Value Date    CHOL 128 01/29/2016    HDL 67 01/29/2016    LDL 53 01/29/2016      Lab Results   Component Value Date     (A) 11/19/2014    Lab Results   Component Value Date    INR 1.14 06/29/2010        Dayday Merida MD   EP Fellow, PGY-8  [phone]       EP STAFF NOTE  Patient seen and examined and management plan personally reviewed with the patient. I agree with the note above by the CV/EP fellow.  Carlos Meneses MD FAC " [facility]  Cardiology - Electrophysiology    Total time spent on patient visit, reviewing notes, imaging, labs, orders, and completing necessary documentation: 30 minutes.

## 2024-05-01 ENCOUNTER — OFFICE VISIT (OUTPATIENT)
Dept: CARDIOLOGY | Facility: CLINIC | Age: 64
End: 2024-05-01
Attending: INTERNAL MEDICINE
Payer: COMMERCIAL

## 2024-05-01 VITALS
BODY MASS INDEX: 23.48 KG/M2 | SYSTOLIC BLOOD PRESSURE: 137 MMHG | WEIGHT: 172.8 LBS | DIASTOLIC BLOOD PRESSURE: 71 MMHG | OXYGEN SATURATION: 100 % | HEART RATE: 52 BPM

## 2024-05-01 DIAGNOSIS — R55 POSTURAL DIZZINESS WITH PRESYNCOPE: ICD-10-CM

## 2024-05-01 DIAGNOSIS — R00.1 BRADYCARDIA: ICD-10-CM

## 2024-05-01 DIAGNOSIS — R42 POSTURAL DIZZINESS WITH PRESYNCOPE: ICD-10-CM

## 2024-05-01 PROCEDURE — G0463 HOSPITAL OUTPT CLINIC VISIT: HCPCS | Performed by: INTERNAL MEDICINE

## 2024-05-01 PROCEDURE — 99214 OFFICE O/P EST MOD 30 MIN: CPT | Mod: GC | Performed by: INTERNAL MEDICINE

## 2024-05-01 RX ORDER — BLOOD SUGAR DIAGNOSTIC
STRIP MISCELLANEOUS
COMMUNITY
Start: 2022-11-16

## 2024-05-01 RX ORDER — FLASH GLUCOSE SENSOR
KIT MISCELLANEOUS
COMMUNITY
Start: 2024-04-09

## 2024-05-01 ASSESSMENT — PAIN SCALES - GENERAL: PAINLEVEL: NO PAIN (0)

## 2024-05-01 NOTE — NURSING NOTE
Chief Complaint   Patient presents with    Follow Up     6 mo follow-up postural dizziness, bradycardia. Review neuro note from 9/19.     Vitals were taken and medications reconciled.    Shayla Arriaga CNA  9:42 AM

## 2024-05-01 NOTE — PATIENT INSTRUCTIONS
Plan:    Follow-up in 1 year      Your Care Team:  EP Cardiology   Telephone Number     Adelina Marks RN [phone]    After business hours: [phone], ask for cardiologist on-call   Non-procedure scheduling:    Chrissy SANTANA   [phone]   Procedure scheduling:    Shelby Patel   [phone]   Device Clinic (Pacemakers, ICDs, Loop Recorders)    During business hours: [phone]  After business hours:   [phone]- select option 4 and ask for job code [id number].       Cardiovascular Clinic:   [address] [place] [address]. [place] [address]      As always, thank you for trusting us with your health care needs!

## 2024-05-01 NOTE — LETTER
5/1/2024      RE: Thad Flores  [address]  [facility] [address]       Dear Colleague,    Thank you for the opportunity to participate in the care of your patient, Thad Flores, at the [facility]. Please see a copy of my visit note below.        ELECTROPHYSIOLOGY CLINIC VISIT    Assessment/Recommendations   Assessment/Plan:    Mr. Flores is a 63 year old male who has a medical HTN, DMII, orthostatic hypotension and nonobstructive CAD.    Bradycardia and Dizziness: Patient was referred for symptom primarily of orthostatic hypotension, which he was recommended to have a pacemaker. He was referred for second opinion with us, and we recommended that he have a tilt-table study. Dr. Murray completed this and noted that he had impaired recovery during valsalva. His conclusion was that he potentially could be developing early autonomic dysfunction but recommended to stay well hydrated, etc. His orthostatic symptoms have potentially 2 components: cardioinhibitory related to short term chronotropic incompetence, and vasoplegia. During his tilt table test his heart rate appeared to have impaired response. Potentially a pacemaker could help his symptoms but unlikely it would solve the whole issue.  He has increased his water intake and electrolyte intake and his symptoms have improved. He is also doing isometric exercises.   - He has been known to have resting bradycardia since college. He is well conditioned and his bradycardia is attributed to this.   - Feels no exertional limitations and heart monitoring has shown adequate HR distribution.   - Stress echo showed normal cardiac function/structue and no ischemia.   - No current indication for pacing for the time being.   - Symptoms postural and appears to have orthostatic hypotension.       - Use compression shorts (athletic compression shorts)      - Increase hydration with goal  to take in 64-72 oz of water/electrolyte fluids per day.       - Liberalize salt/electrolyte intake      - Change positions carefully and slowly and maintain safe environment.       - Standing training and supine isometric exercises.     Plan for today:  1. Will continue to monitor patient and follow up in 12 months.         History of Present Illness/Subjective    Mr. Thad Flores is a 62 year old male who comes in today for EP consultation of bradycardia and dizziness.    Mr. Flores is a 62 year old male who has a medical HTN, pre-DM, orthostatism, and nonobstructive CAD.  He is seeking a second opinion for postural dizziness and bradycardia.  He saw EP at [facility] on 3/15/2023 complaining of more progressive dizziness.  His losartan was discontinued due to symptoms of dizziness but not clear that this provided much benefit.  He primarily reported dizziness with postural changes.  He was encouraged to maintain adequate hydration.  He uses his Peloton bike without exertional limitation.  He had ECG at our recent office visit that showed resting heart rate was 37 bpm, SB with 1 AVB.  This prompted a Zio patch monitor in 11/2022 that showed HR 32- 139 with average heart rate 48 bpm, 1 NSVT 4 beats, 2 PSVT up to 8 beats.  He had a stress echo in 1/2023 that was negative for ischemia, good exercise duration and workload, normal function, and no symptoms were elicited.  At [facility] office visit, he reported that he is always had a low resting heart rate attributed to his athletic conditioning.  For the last several years he has had dizziness and lightheadedness with postural changes and had a syncopal episode a few years ago after standing up from a couch.  He noted the episodes were occurring several times a day and not resolving as quickly.  He gets a tingling sensation in his abdomen and feels that his body grows heavier as he stands.  He notes occasionally getting some graying out/tunnel vision but no brayden syncope.   He has maintained excellent hydration per his report.  He has been worried about his HR as his smart watch shows that he is frequently getting HR in the low 40s and into the high 30s at times.  Apparently PPM was discussed with him at the visit but he had elected to monitor.  He then asked to pursue a second opinion for which he presents today.    He reports feeling at baseline. He was offered a PPM through [facility]. He has had bradycardia since college years. He has postural dizziness at times but no dizziness without changing posture. This was managed with fluids and increased salt intake. Now having worsening orthostatism. He feels best after exercising. HbA1C 6-6.5. He denies chest discomfort, palpitations, abdominal fullness/bloating or peripheral edema, shortness of breath, paroxysmal nocturnal dyspnea, orthopnea, lightheadedness, dizziness, pre-syncope, or syncope. Presenting 12 lead ECG shows SB 1 AVB Vent Rate 40 bpm,  ms,  ms, QTc 414 ms. . Current cardiac medications include: Crestor, Losartan, Zetia, and ASA.     EP Visit 7/12/2023: Patient presents today for follow up. He was referred to Dr. Murray for tilt table testing. Testing was notable for impaired heart rate recovery time. Recommendations following testing were to increase hydration, liberalize salt intake, and wear tight fitting shorts / pants. He has done this and his symptoms are significantly improved. He is still able to exercise--symptoms seem to only be present with leisurely activity (mildly light-headedness), however, if he is intensely biking or swimming he doesn't seem to have any symptoms. No other symptoms--no fevers, chills, chest pain, shortness of breath, abdominal pain, nausea, vomiting, or diarrhea.    EP Visit 10/25/2023: Patient presents today for follow up. He just got back from a 12 day trip to [place] and he was able to complete all activities without any sort of limitation. He notes though that he had  intermittent dizziness and light-headedness that seem to be worse during the trip and after he returned home. He notes that the symptoms have worsened, but he is still able to exercise without difficulty. He saw neurology who did not think there was an underlying neurological process playing a role. Aside from this he is doing well. No fevers, chills, chest pain, SOB, abdominal pain, nausea, vomiting, or diarrhea.    EP visit 5/1/2024: He presents today for follow-up.  Over the winter he traveled to [place] to go skiing and also spent time in [place].  He notes marked improvement of symptoms while traveling.  He has been drinking increased water as well as electrolytes.  Also doing isometric exercises.  Overall feels much improved with regards to symptoms of lightheadedness.  Notes he still gets symptoms usually few times a day however they are mild.  No syncope or presyncope. No fevers, chills, chest pain, SOB, abdominal pain, nausea, vomiting, or diarrhea.    I have reviewed and updated the patient's Past Medical History, Social History, Family History and Medication List.     Cardiographics (Personally Reviewed) :   1/3/23 Stress Echo (OSH Report):   Final Impressions:    1. Negative stress echo for ischemia.    2. Post stress, decreased left ventricular size, increased global systolic function with an estimated EF of 70 to 75%.    3. Maximum stress test with 88.3% of age predicted maximum heart rate achieved.    4. There were no ischemic changes by EKG during stress.    5. Good exercise duration and workload.    6. During stress exam the patient developed no significant symptoms.    7. 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    8. Compared to 6/18/2020 dilated sinuses of valsalva (prior to 3.8 cm).          Physical Examination   There were no vitals taken for this visit.  Wt Readings from Last 3 Encounters:   10/25/23 77.1 kg (170 lb)   09/19/23 75.8 kg (167 lb)   07/12/23 77.1 kg (170  "lb)     General Appearance:   Alert, well-appearing and in no acute distress.   HEENT: Atraumatic, normocephalic. PERRL.  MMM.   Chest/Lungs:   Respirations unlabored.  Lungs are clear to auscultation.   Cardiovascular:   Regular rate and rhythm.  S1/S2. No murmur.    Abdomen:  Soft, nontender, nondistended.   Extremities: No cyanosis or clubbing. No edema.    Musculoskeletal: Moves all extremities.  Gait normal.   Skin: Warm, dry, intact.    Neurologic: Mood and affect are appropriate.  Alert and oriented to person, place, time, and situation.          Medications  Allergies   Current Outpatient Medications   Medication Sig Dispense Refill     aspirin 81 MG tablet Take 81 mg by mouth daily       Cholecalciferol (VITAMIN D3 PO) Take 2,000 Units by mouth daily       cyanocobalamin (VITAMIN B-12) 1000 MCG sublingual tablet        ezetimibe (ZETIA) 10 MG tablet Take 1 tablet by mouth daily       losartan (COZAAR) 50 MG tablet Take 50 mg by mouth       metFORMIN (GLUCOPHAGE) 500 MG tablet Take 500 mg by mouth       nitroGLYcerin (NITROSTAT) 0.4 MG sublingual tablet        psyllium (METAMUCIL/KONSYL) Packet Take 1 packet by mouth daily       rosuvastatin (CRESTOR) 20 MG tablet Take 20 mg by mouth      Allergies   Allergen Reactions     Aspirin Anaphylaxis     Reported rash and anaphylaxis with concomitant use of IBF, tests with allergist did not confirm aspirin allergy.     Ibuprofen Rash     With aspirin use anaphylaxis.     Penicillins      Fascial swelling     Wasp Venom Protein Other (See Comments)     \"extreme swelling\"         Lab Results (Personally Reviewed)    Chemistry/lipid CBC Cardiac Enzymes/BNP/TSH/INR   Lab Results   Component Value Date    BUN 23 01/29/2016     01/29/2016    CO2 30 01/29/2016     Creatinine   Date Value Ref Range Status   01/29/2016 1.07 0.66 - 1.25 mg/dL Final       Lab Results   Component Value Date    CHOL 128 01/29/2016    HDL 67 01/29/2016    LDL 53 01/29/2016      Lab Results "   Component Value Date     (A) 11/19/2014    Lab Results   Component Value Date    INR 1.14 06/29/2010        Dayday Merida MD   EP Fellow, PGY-8  [phone]       EP STAFF NOTE  Patient seen and examined and management plan personally reviewed with the patient. I agree with the note above by the CV/EP fellow.  Carlos Meneses MD [facility]  Cardiology - Electrophysiology    Total time spent on patient visit, reviewing notes, imaging, labs, orders, and completing necessary documentation: 30 minutes.

## 2024-08-25 ENCOUNTER — HEALTH MAINTENANCE LETTER (OUTPATIENT)
Age: 64
End: 2024-08-25

## 2024-11-03 ENCOUNTER — HEALTH MAINTENANCE LETTER (OUTPATIENT)
Age: 64
End: 2024-11-03

## 2024-11-08 NOTE — H&P (VIEW-ONLY)
[facility] ([facility])      Preoperative Consultation     Thad Flores   1960   male    DATE OF ENCOUNTER: 11/8/2024    Nursing Notes:   Anabella Schaffer MA  11/8/2024  3:22 PM  Signed    Surgeon:  Dr. Tapia  Surgical Procedure: RIGHT SHOULDER ARTHROSCOPY, SUBACROMIAL DECOMPRESSION,   Date of Surgery:  11/18/2024  Surgery Center Location:  [facility]    Fax number: [phone]       Anabella Schaffer MA ....................  11/8/2024   3:04 PM        History of Present Illness     Feeling fine  Working out regularly ellipitical walking weights  No chest pain or shortness of breath.   No palpitations, tachycardia or syncope;   Postural light-headedness hydration  No change in bowel or bladder habits.        Review of Systems   A comprehensive review of systems was negative except for items noted in HPI.     Patient Active Problem List   Diagnosis Code     Urticaria '06 lab work up negative-[place] - prn claritin L50.9     Abnormal CT scan of heart - negative stress echo '23 R93.1     Sinus bradycardia R00.1     Controlled type 2 diabetes mellitus without complication, without long-term current use of insulin (HC) E11.9     Essential hypertension I10     Postural lightheadedness R42     Hyperlipidemia E78.5     Current Outpatient Medications   Medication Sig     aspirin (ECOTRIN) 81 mg enteric coated tablet Take 1 tablet by mouth once daily with a meal.     blood sugar diagnostic (OneTouch Ultra Test) strip Tests once daily.     blood-glucose meter Dispense meter, test strips, lancets covered by pt ins. R73.03 Pre diabetes.  Test once per day.     cholecalciferol (VITAMIN D3) 1,000 unit capsule Take 1 capsule by mouth once daily.     continuous glucose monitor SENSOR KIT (FreeStyle Emily 14 Day Sensor) AS DIRECTED. REPLACE EVERY 14 DAYS     cyanocobalamin (VITAMIN B-12) 1,000 mcg tablet Take 1 tablet by mouth once daily.     ezetimibe (ZETIA) 10 mg  "tablet Take 1 Tablet (10 mg) by mouth once daily.     losartan (COZAAR) 50 mg tablet Take 1 Tablet (50 mg) by mouth once daily. TAKE IN THE EVENING BEFORE BEDTIME.     metFORMIN (GLUCOPHAGE) 500 mg tablet Take 1 Tablet (500 mg) by mouth two times daily with meals.     nitroglycerin (NITROSTAT) 0.4 mg sublingual tablet PLACE 1 TAB UNDER THE TONGUE EVERY 5 MIN IF NEEDED FOR CHEST PAIN. MAX 3 TABS/15 MINUTE PERIOD     psyllium husk, with sugar, (METAMUCIL, WITH SUGAR,) 3.4 gram packet Take 1 Packet by mouth once daily in the afternoon.     rosuvastatin (CRESTOR) 20 mg tablet Take 1 Tablet (20 mg) by mouth once daily.     No current facility-administered medications for this visit.     Medications have been reviewed by me and are current to the best of my knowledge and ability.        Allergies   Allergen Reactions     Pcn [Penicillins] Angioedema     Tolerated cefazolin 10/2018     Wasp Venom Protein Starter Kit [Venom-Wasp] Other - Describe In Comment Field     \"extreme swelling\"     Past Surgical History:   . Laterality Date     HERNIA REPAIR Left 2014     hx of hamstring repair       hx of pilonidal cyst repair       hx surgery on finger       RHINOPLASTY       Social History     Tobacco Use     Smoking status: Never     Smokeless tobacco: Never   Vaping Use     Vaping status: Never Used   Substance Use Topics     Alcohol use: Yes     Comment: 1-2 drinks a day     Drug use: No      Family History   Problem Relation Age of Onset     Hypertension Father      Diabetes Maternal Grandfather      Hypertension Mother         and pre diabetes        PAST DIFFICULTY WITH ANESTHESIA:  history of bradycardia       Physical Exam   General Appearance: nad   /76 (Cuff Site: Left Arm, Position: Sitting, Cuff Size: Adult Regular)   Pulse 57   Ht 1.82 m (5' 11.65\")   Wt 74.9 kg (165 lb 3.2 oz)   BMI 22.62 kg/m    EYES: Anicteric  HEAD, EARS, NOSE, MOUTH, AND THROAT: Normal  NECK: Normal  RESPIRATORY: Lungs clear to " auscultation.  CARDIOVASCULAR:  Regular  GASTROINTESTINAL: Normal  MUSCULOSKELETAL:Per ortho  SKIN/HAIR/NAILS:  No lesion note  NEUROLOGIC: Non focal  PSYCHIATRIC: Normal      The Pre-Op Tool    Recommendations      Low Risk Procedure    Cardiac History  No history of coronary artery disease           Labs  Potassium within last 30 days  EKG  Not indicated  Stress Testing  Not indicated    * Testing recommendations are intended to assist, but not direct, clinical decisions.           Do not take metformin the evening before the procedure or the morning of the procedure.  Hold Losartan (Cozaar) the evening before and/or morning of the procedure.  Hold Aspirin for 7 days prior to procedure    * Medication recommendations are not intended to be exhaustive; they are limited to common medications that are potentially dangerous if incorrectly managed          Labs  * Data supports elimination of  routine  laboratory testing in favor of focused,  indicated  testing based on medical co-morbidities. A 2009 study randomized 1061 patients undergoing ambulatory, non-cataract surgery to routine or to indicated testing. Perioperative adverse events were similar (Anesthesia & Analgesia 2009;108:467-75; Anesthesiol. Clin. 2016 Mar;34(1):43-58).  EKG  * The ACC/AHA recommends against obtaining routine EKGs in patients undergoing low risk surgeries, a class IIa recommendation (JACC. 2014;64(21);e1-76).  Antiplatelet Therapy  * In the 2014 POISE-2 trial, continuation of aspirin in high cardiovascular risk patients undergoing non-cardiac surgery increased the risk for major bleeding without reducing the rate of death, myocardial infarction, or stroke. In most circumstances our experts recommend a 7 day aspirin hold in patients without coronary stents. Continuation of aspirin may be reasonable in patients with high risk coronary artery disease or cerebrovascular disease who are not undergoing high bleeding risk procedures (NEJM  2014;370:1494-503; VIJAY 2015; Clin Med 2016; 16: 535-40; Anest Analg 2020; 131: 111-23).  RAAS Antagonist  * Hypotension during anesthesia is associated with continuing renin-angiotensin system (RAAS) antagonist. While it is unclear if holding RAAS antagonists reduces postoperative complications, in most circumstances our experts recommend holding RAAS antagonist 24 hours prior to surgery. (Postgrad Med J 2011;87:472-81; Anest 2017;126:16-27; BMC Anesthesiol 2018;18:26.)     Session ID: [id number]  Endnotes and bibliography available upon request: [email or web address]     Assessment / Plan   Z01.810 Preoperative examination, unspecified  (primary encounter diagnosis)  Comment: Thad Flores is a 64 y.o. male scheduled to undergo a(n) low risk surgery. The patient has no active cardiac or pulmonary symptoms and he has good exertional capacity. No further cardiac evaluation is indicated.       Plan:   - Pt is medically stable with excellent exercise capacity   - Labs: K 4.8 HgbA1c 6.4  - EKG: Not indicated  - Imaging: None indicated   - COVID testing Not indicated  - Pre-op instructions see above hold ASA one week before procedure    (I10) Essential hypertension  Comment: good control stable hold losartan 24 hours before procedure  Plan: POTASSIUM,    (E78.5) Hyperlipidemia, unspecified hyperlipidemia type  Comment: no unusual aches or pains   Plan: continue as ordered with crestor and zetia    (E11.9) Controlled type 2 diabetes mellitus without complication, without long-term current use of insulin (HC)  Comment: doing remarkably will  hold metformin before surgery  Plan:  Recommend glucometer checks, sliding scale and D5 containing fluids on call to OR    (R00.1) Sinus bradycardia  Comment: negative work up by cardiology for cardiac pacing; need to follow for slow heart rate during procedure and immediate post-operative period    (R93.1) Abnormal CT scan of heart - negative  stress echo '23  Comment: pt with non-obstructive CAD- excellent exercise capacity normal LV function         Total time 41'            Slava Hearn MD  [facility]  May page me via [employer]    [facility] ([place] and [facility] ([place])  Main Phone: [phone]  Fax: [phone]

## 2024-11-17 ENCOUNTER — ANESTHESIA EVENT (OUTPATIENT)
Dept: SURGERY | Facility: CLINIC | Age: 64
End: 2024-11-17
Payer: COMMERCIAL

## 2024-11-18 ENCOUNTER — ANESTHESIA (OUTPATIENT)
Dept: SURGERY | Facility: CLINIC | Age: 64
End: 2024-11-18
Payer: COMMERCIAL

## 2024-11-18 ENCOUNTER — HOSPITAL ENCOUNTER (OUTPATIENT)
Facility: CLINIC | Age: 64
Discharge: HOME | End: 2024-11-18
Attending: ORTHOPAEDIC SURGERY | Admitting: ORTHOPAEDIC SURGERY
Payer: COMMERCIAL

## 2024-11-18 VITALS
TEMPERATURE: 97 F | OXYGEN SATURATION: 96 % | WEIGHT: 165 LBS | HEIGHT: 71 IN | DIASTOLIC BLOOD PRESSURE: 63 MMHG | RESPIRATION RATE: 15 BRPM | BODY MASS INDEX: 23.1 KG/M2 | HEART RATE: 44 BPM | SYSTOLIC BLOOD PRESSURE: 112 MMHG

## 2024-11-18 DIAGNOSIS — Z98.890 STATUS POST RIGHT ROTATOR CUFF REPAIR: Primary | ICD-10-CM

## 2024-11-18 LAB
APTT PPP: 30 SECONDS (ref 22–38)
CREAT SERPL-MCNC: 0.88 MG/DL (ref 0.67–1.17)
EGFRCR SERPLBLD CKD-EPI 2021: >90 ML/MIN/1.73M2
ERYTHROCYTE [DISTWIDTH] IN BLOOD BY AUTOMATED COUNT: 11.6 % (ref 10–15)
GLUCOSE BLDC GLUCOMTR-MCNC: 128 MG/DL (ref 70–99)
GLUCOSE BLDC GLUCOMTR-MCNC: 141 MG/DL (ref 70–99)
HCT VFR BLD AUTO: 41.9 % (ref 40–53)
HGB BLD-MCNC: 14.4 G/DL (ref 13.3–17.7)
INR PPP: 1.03 (ref 0.85–1.15)
MCH RBC QN AUTO: 31.5 PG (ref 26.5–33)
MCHC RBC AUTO-ENTMCNC: 34.4 G/DL (ref 31.5–36.5)
MCV RBC AUTO: 92 FL (ref 78–100)
PLATELET # BLD AUTO: 135 10E3/UL (ref 150–450)
POTASSIUM SERPL-SCNC: 4.5 MMOL/L (ref 3.4–5.3)
RBC # BLD AUTO: 4.57 10E6/UL (ref 4.4–5.9)
WBC # BLD AUTO: 4.2 10E3/UL (ref 4–11)

## 2024-11-18 PROCEDURE — 85610 PROTHROMBIN TIME: CPT | Performed by: PHYSICIAN ASSISTANT

## 2024-11-18 PROCEDURE — 82565 ASSAY OF CREATININE: CPT | Performed by: PHYSICIAN ASSISTANT

## 2024-11-18 PROCEDURE — 250N000013 HC RX MED GY IP 250 OP 250 PS 637: Performed by: ANESTHESIOLOGY

## 2024-11-18 PROCEDURE — 84132 ASSAY OF SERUM POTASSIUM: CPT | Performed by: PHYSICIAN ASSISTANT

## 2024-11-18 PROCEDURE — 250N000011 HC RX IP 250 OP 636: Performed by: NURSE ANESTHETIST, CERTIFIED REGISTERED

## 2024-11-18 PROCEDURE — 370N000017 HC ANESTHESIA TECHNICAL FEE, PER MIN: Performed by: ORTHOPAEDIC SURGERY

## 2024-11-18 PROCEDURE — 85027 COMPLETE CBC AUTOMATED: CPT | Performed by: PHYSICIAN ASSISTANT

## 2024-11-18 PROCEDURE — 250N000011 HC RX IP 250 OP 636: Performed by: ANESTHESIOLOGY

## 2024-11-18 PROCEDURE — 710N000012 HC RECOVERY PHASE 2, PER MINUTE: Performed by: ORTHOPAEDIC SURGERY

## 2024-11-18 PROCEDURE — 999N000141 HC STATISTIC PRE-PROCEDURE NURSING ASSESSMENT: Performed by: ORTHOPAEDIC SURGERY

## 2024-11-18 PROCEDURE — 250N000025 HC SEVOFLURANE, PER MIN: Performed by: ORTHOPAEDIC SURGERY

## 2024-11-18 PROCEDURE — 258N000001 HC RX 258: Performed by: ORTHOPAEDIC SURGERY

## 2024-11-18 PROCEDURE — C1713 ANCHOR/SCREW BN/BN,TIS/BN: HCPCS | Performed by: ORTHOPAEDIC SURGERY

## 2024-11-18 PROCEDURE — 360N000077 HC SURGERY LEVEL 4, PER MIN: Performed by: ORTHOPAEDIC SURGERY

## 2024-11-18 PROCEDURE — 250N000009 HC RX 250: Performed by: NURSE ANESTHETIST, CERTIFIED REGISTERED

## 2024-11-18 PROCEDURE — 85730 THROMBOPLASTIN TIME PARTIAL: CPT | Performed by: PHYSICIAN ASSISTANT

## 2024-11-18 PROCEDURE — 250N000011 HC RX IP 250 OP 636: Mod: JW | Performed by: ANESTHESIOLOGY

## 2024-11-18 PROCEDURE — 272N000001 HC OR GENERAL SUPPLY STERILE: Performed by: ORTHOPAEDIC SURGERY

## 2024-11-18 PROCEDURE — 36415 COLL VENOUS BLD VENIPUNCTURE: CPT | Performed by: PHYSICIAN ASSISTANT

## 2024-11-18 PROCEDURE — C9290 INJ, BUPIVACAINE LIPOSOME: HCPCS | Performed by: ANESTHESIOLOGY

## 2024-11-18 PROCEDURE — 258N000003 HC RX IP 258 OP 636: Performed by: ANESTHESIOLOGY

## 2024-11-18 PROCEDURE — 82962 GLUCOSE BLOOD TEST: CPT

## 2024-11-18 PROCEDURE — 710N000010 HC RECOVERY PHASE 1, LEVEL 2, PER MIN: Performed by: ORTHOPAEDIC SURGERY

## 2024-11-18 DEVICE — BIO-COMP SWVLK C, CLD 4.75X19.1MM
Type: IMPLANTABLE DEVICE | Site: SHOULDER | Status: FUNCTIONAL
Brand: ARTHREX®

## 2024-11-18 DEVICE — SP FIBERTAK RC, DBLOAD TAPE BL/W, BLK/W
Type: IMPLANTABLE DEVICE | Site: SHOULDER | Status: FUNCTIONAL
Brand: ARTHREX®

## 2024-11-18 RX ORDER — CLINDAMYCIN PHOSPHATE 900 MG/50ML
INJECTION, SOLUTION INTRAVENOUS
Status: DISCONTINUED
Start: 2024-11-18 | End: 2024-11-18 | Stop reason: HOSPADM

## 2024-11-18 RX ORDER — DEXAMETHASONE SODIUM PHOSPHATE 4 MG/ML
INJECTION, SOLUTION INTRA-ARTICULAR; INTRALESIONAL; INTRAMUSCULAR; INTRAVENOUS; SOFT TISSUE PRN
Status: DISCONTINUED | OUTPATIENT
Start: 2024-11-18 | End: 2024-11-18

## 2024-11-18 RX ORDER — ONDANSETRON 2 MG/ML
4 INJECTION INTRAMUSCULAR; INTRAVENOUS EVERY 30 MIN PRN
Status: DISCONTINUED | OUTPATIENT
Start: 2024-11-18 | End: 2024-11-18 | Stop reason: HOSPADM

## 2024-11-18 RX ORDER — BUPIVACAINE HYDROCHLORIDE 5 MG/ML
INJECTION, SOLUTION EPIDURAL; INTRACAUDAL
Status: DISCONTINUED | OUTPATIENT
Start: 2024-11-18 | End: 2024-11-18

## 2024-11-18 RX ORDER — NALOXONE HYDROCHLORIDE 0.4 MG/ML
0.1 INJECTION, SOLUTION INTRAMUSCULAR; INTRAVENOUS; SUBCUTANEOUS
Status: DISCONTINUED | OUTPATIENT
Start: 2024-11-18 | End: 2024-11-18 | Stop reason: HOSPADM

## 2024-11-18 RX ORDER — PROPOFOL 10 MG/ML
INJECTION, EMULSION INTRAVENOUS PRN
Status: DISCONTINUED | OUTPATIENT
Start: 2024-11-18 | End: 2024-11-18

## 2024-11-18 RX ORDER — ACETAMINOPHEN 325 MG/1
975 TABLET ORAL ONCE
Status: COMPLETED | OUTPATIENT
Start: 2024-11-18 | End: 2024-11-18

## 2024-11-18 RX ORDER — OXYCODONE HYDROCHLORIDE 5 MG/1
10 TABLET ORAL
Status: DISCONTINUED | OUTPATIENT
Start: 2024-11-18 | End: 2024-11-18 | Stop reason: HOSPADM

## 2024-11-18 RX ORDER — ONDANSETRON 4 MG/1
4 TABLET, ORALLY DISINTEGRATING ORAL EVERY 30 MIN PRN
Status: DISCONTINUED | OUTPATIENT
Start: 2024-11-18 | End: 2024-11-18 | Stop reason: HOSPADM

## 2024-11-18 RX ORDER — OXYCODONE HYDROCHLORIDE 5 MG/1
5 TABLET ORAL
Status: DISCONTINUED | OUTPATIENT
Start: 2024-11-18 | End: 2024-11-18 | Stop reason: HOSPADM

## 2024-11-18 RX ORDER — PROPOFOL 10 MG/ML
INJECTION, EMULSION INTRAVENOUS CONTINUOUS PRN
Status: DISCONTINUED | OUTPATIENT
Start: 2024-11-18 | End: 2024-11-18

## 2024-11-18 RX ORDER — GLYCOPYRROLATE 0.2 MG/ML
0.2 INJECTION, SOLUTION INTRAMUSCULAR; INTRAVENOUS ONCE
Status: COMPLETED | OUTPATIENT
Start: 2024-11-18 | End: 2024-11-18

## 2024-11-18 RX ORDER — HYDROMORPHONE HCL IN WATER/PF 6 MG/30 ML
0.4 PATIENT CONTROLLED ANALGESIA SYRINGE INTRAVENOUS EVERY 5 MIN PRN
Status: DISCONTINUED | OUTPATIENT
Start: 2024-11-18 | End: 2024-11-18 | Stop reason: HOSPADM

## 2024-11-18 RX ORDER — GLYCOPYRROLATE 0.2 MG/ML
INJECTION, SOLUTION INTRAMUSCULAR; INTRAVENOUS PRN
Status: DISCONTINUED | OUTPATIENT
Start: 2024-11-18 | End: 2024-11-18

## 2024-11-18 RX ORDER — ASPIRIN 81 MG/1
81 TABLET ORAL 2 TIMES DAILY
Qty: 28 TABLET | Refills: 0 | Status: SHIPPED | OUTPATIENT
Start: 2024-11-18

## 2024-11-18 RX ORDER — OXYCODONE HYDROCHLORIDE 5 MG/1
5-10 TABLET ORAL EVERY 4 HOURS PRN
Qty: 20 TABLET | Refills: 0 | Status: SHIPPED | OUTPATIENT
Start: 2024-11-18

## 2024-11-18 RX ORDER — EPHEDRINE SULFATE 50 MG/ML
INJECTION, SOLUTION INTRAMUSCULAR; INTRAVENOUS; SUBCUTANEOUS PRN
Status: DISCONTINUED | OUTPATIENT
Start: 2024-11-18 | End: 2024-11-18

## 2024-11-18 RX ORDER — CLINDAMYCIN PHOSPHATE 900 MG/50ML
INJECTION, SOLUTION INTRAVENOUS PRN
Status: DISCONTINUED | OUTPATIENT
Start: 2024-11-18 | End: 2024-11-18

## 2024-11-18 RX ORDER — CLINDAMYCIN PHOSPHATE 900 MG/50ML
900 INJECTION, SOLUTION INTRAVENOUS SEE ADMIN INSTRUCTIONS
Status: DISCONTINUED | OUTPATIENT
Start: 2024-11-18 | End: 2024-11-18

## 2024-11-18 RX ORDER — FLUMAZENIL 0.1 MG/ML
0.2 INJECTION, SOLUTION INTRAVENOUS
Status: DISCONTINUED | OUTPATIENT
Start: 2024-11-18 | End: 2024-11-18 | Stop reason: HOSPADM

## 2024-11-18 RX ORDER — FENTANYL CITRATE 50 UG/ML
25-100 INJECTION, SOLUTION INTRAMUSCULAR; INTRAVENOUS
Status: DISCONTINUED | OUTPATIENT
Start: 2024-11-18 | End: 2024-11-18 | Stop reason: HOSPADM

## 2024-11-18 RX ORDER — SODIUM CHLORIDE, SODIUM LACTATE, POTASSIUM CHLORIDE, CALCIUM CHLORIDE 600; 310; 30; 20 MG/100ML; MG/100ML; MG/100ML; MG/100ML
INJECTION, SOLUTION INTRAVENOUS CONTINUOUS
Status: DISCONTINUED | OUTPATIENT
Start: 2024-11-18 | End: 2024-11-18 | Stop reason: HOSPADM

## 2024-11-18 RX ORDER — ACETAMINOPHEN 325 MG/1
650 TABLET ORAL
Status: DISCONTINUED | OUTPATIENT
Start: 2024-11-18 | End: 2024-11-18 | Stop reason: HOSPADM

## 2024-11-18 RX ORDER — DEXAMETHASONE SODIUM PHOSPHATE 4 MG/ML
4 INJECTION, SOLUTION INTRA-ARTICULAR; INTRALESIONAL; INTRAMUSCULAR; INTRAVENOUS; SOFT TISSUE
Status: DISCONTINUED | OUTPATIENT
Start: 2024-11-18 | End: 2024-11-18 | Stop reason: HOSPADM

## 2024-11-18 RX ORDER — ONDANSETRON 4 MG/1
4 TABLET, ORALLY DISINTEGRATING ORAL
Status: DISCONTINUED | OUTPATIENT
Start: 2024-11-18 | End: 2024-11-18 | Stop reason: HOSPADM

## 2024-11-18 RX ORDER — HYDROXYZINE PAMOATE 25 MG/1
25-50 CAPSULE ORAL EVERY 4 HOURS PRN
Qty: 20 CAPSULE | Refills: 0 | Status: SHIPPED | OUTPATIENT
Start: 2024-11-18

## 2024-11-18 RX ORDER — ONDANSETRON 2 MG/ML
INJECTION INTRAMUSCULAR; INTRAVENOUS PRN
Status: DISCONTINUED | OUTPATIENT
Start: 2024-11-18 | End: 2024-11-18

## 2024-11-18 RX ORDER — DEXAMETHASONE SODIUM PHOSPHATE 10 MG/ML
4 INJECTION, SOLUTION INTRAMUSCULAR; INTRAVENOUS
Status: DISCONTINUED | OUTPATIENT
Start: 2024-11-18 | End: 2024-11-18 | Stop reason: HOSPADM

## 2024-11-18 RX ORDER — CEFAZOLIN SODIUM/WATER 2 G/20 ML
2 SYRINGE (ML) INTRAVENOUS
Status: DISCONTINUED | OUTPATIENT
Start: 2024-11-18 | End: 2024-11-18 | Stop reason: HOSPADM

## 2024-11-18 RX ORDER — FENTANYL CITRATE 50 UG/ML
50 INJECTION, SOLUTION INTRAMUSCULAR; INTRAVENOUS EVERY 5 MIN PRN
Status: DISCONTINUED | OUTPATIENT
Start: 2024-11-18 | End: 2024-11-18 | Stop reason: HOSPADM

## 2024-11-18 RX ORDER — FENTANYL CITRATE 50 UG/ML
INJECTION, SOLUTION INTRAMUSCULAR; INTRAVENOUS PRN
Status: DISCONTINUED | OUTPATIENT
Start: 2024-11-18 | End: 2024-11-18

## 2024-11-18 RX ORDER — FENTANYL CITRATE 50 UG/ML
25 INJECTION, SOLUTION INTRAMUSCULAR; INTRAVENOUS EVERY 5 MIN PRN
Status: DISCONTINUED | OUTPATIENT
Start: 2024-11-18 | End: 2024-11-18 | Stop reason: HOSPADM

## 2024-11-18 RX ORDER — HYDROMORPHONE HCL IN WATER/PF 6 MG/30 ML
0.2 PATIENT CONTROLLED ANALGESIA SYRINGE INTRAVENOUS EVERY 5 MIN PRN
Status: DISCONTINUED | OUTPATIENT
Start: 2024-11-18 | End: 2024-11-18 | Stop reason: HOSPADM

## 2024-11-18 RX ORDER — CEFAZOLIN SODIUM/WATER 2 G/20 ML
2 SYRINGE (ML) INTRAVENOUS SEE ADMIN INSTRUCTIONS
Status: DISCONTINUED | OUTPATIENT
Start: 2024-11-18 | End: 2024-11-18 | Stop reason: HOSPADM

## 2024-11-18 RX ORDER — LIDOCAINE 40 MG/G
CREAM TOPICAL
Status: DISCONTINUED | OUTPATIENT
Start: 2024-11-18 | End: 2024-11-18 | Stop reason: HOSPADM

## 2024-11-18 RX ORDER — CLINDAMYCIN PHOSPHATE 900 MG/50ML
900 INJECTION, SOLUTION INTRAVENOUS
Status: DISCONTINUED | OUTPATIENT
Start: 2024-11-18 | End: 2024-11-18

## 2024-11-18 RX ADMIN — PROPOFOL 150 MCG/KG/MIN: 10 INJECTION, EMULSION INTRAVENOUS at 10:07

## 2024-11-18 RX ADMIN — FENTANYL CITRATE 50 MCG: 50 INJECTION INTRAMUSCULAR; INTRAVENOUS at 09:09

## 2024-11-18 RX ADMIN — FENTANYL CITRATE 50 MCG: 50 INJECTION INTRAMUSCULAR; INTRAVENOUS at 10:38

## 2024-11-18 RX ADMIN — ROCURONIUM BROMIDE 50 MG: 10 INJECTION, SOLUTION INTRAVENOUS at 10:07

## 2024-11-18 RX ADMIN — ONDANSETRON 4 MG: 2 INJECTION INTRAMUSCULAR; INTRAVENOUS at 12:00

## 2024-11-18 RX ADMIN — ACETAMINOPHEN 975 MG: 325 TABLET ORAL at 08:49

## 2024-11-18 RX ADMIN — BUPIVACAINE 10 ML: 13.3 INJECTION, SUSPENSION, LIPOSOMAL INFILTRATION at 09:13

## 2024-11-18 RX ADMIN — MIDAZOLAM HYDROCHLORIDE 1 MG: 1 INJECTION, SOLUTION INTRAMUSCULAR; INTRAVENOUS at 09:08

## 2024-11-18 RX ADMIN — Medication 5 MG: at 10:04

## 2024-11-18 RX ADMIN — GLYCOPYRROLATE 0.2 MG: 0.2 INJECTION INTRAMUSCULAR; INTRAVENOUS at 10:04

## 2024-11-18 RX ADMIN — Medication 200 MG: at 11:56

## 2024-11-18 RX ADMIN — DEXAMETHASONE SODIUM PHOSPHATE 4 MG: 4 INJECTION, SOLUTION INTRA-ARTICULAR; INTRALESIONAL; INTRAMUSCULAR; INTRAVENOUS; SOFT TISSUE at 10:20

## 2024-11-18 RX ADMIN — FENTANYL CITRATE 50 MCG: 50 INJECTION INTRAMUSCULAR; INTRAVENOUS at 09:10

## 2024-11-18 RX ADMIN — BUPIVACAINE HYDROCHLORIDE 11 ML: 5 INJECTION, SOLUTION EPIDURAL; INTRACAUDAL; PERINEURAL at 09:13

## 2024-11-18 RX ADMIN — GLYCOPYRROLATE 0.2 MG: 0.2 INJECTION, SOLUTION INTRAMUSCULAR; INTRAVENOUS at 09:07

## 2024-11-18 RX ADMIN — PROPOFOL 150 MG: 10 INJECTION, EMULSION INTRAVENOUS at 10:06

## 2024-11-18 RX ADMIN — SODIUM CHLORIDE, POTASSIUM CHLORIDE, SODIUM LACTATE AND CALCIUM CHLORIDE: 600; 310; 30; 20 INJECTION, SOLUTION INTRAVENOUS at 10:00

## 2024-11-18 RX ADMIN — CLINDAMYCIN PHOSPHATE 900 MG: 900 INJECTION, SOLUTION INTRAVENOUS at 10:05

## 2024-11-18 RX ADMIN — BUPIVACAINE HYDROCHLORIDE 4 ML: 5 INJECTION, SOLUTION EPIDURAL; INTRACAUDAL at 09:15

## 2024-11-18 RX ADMIN — FENTANYL CITRATE 50 MCG: 50 INJECTION INTRAMUSCULAR; INTRAVENOUS at 10:07

## 2024-11-18 ASSESSMENT — ACTIVITIES OF DAILY LIVING (ADL)
ADLS_ACUITY_SCORE: 0

## 2024-11-18 ASSESSMENT — ENCOUNTER SYMPTOMS: DYSRHYTHMIAS: 1

## 2024-11-18 NOTE — ANESTHESIA CARE TRANSFER NOTE
Patient: Thad Flores    Procedure: Procedure(s):  RIGHT SHOULDER ARTHROSCOPY, SUBACROMIAL DECOMPRESSION, ROTATOR CUFF REPAIR AND BICEP TENDONESIS  DISTAL CLAVICLE EXCISION       Diagnosis: Impingement of right shoulder [M25.811]  Right shoulder pain [M25.511]  Diagnosis Additional Information: No value filed.    Anesthesia Type:   General     Note:    Oropharynx: oral airway in place and spontaneously breathing  Level of Consciousness: drowsy  Oxygen Supplementation: face mask  Level of Supplemental Oxygen (L/min / FiO2): 8  Independent Airway: airway patency not satisfactory and stable (oral airway)  Dentition: dentition unchanged  Vital Signs Stable: post-procedure vital signs reviewed and stable  Report to RN Given: handoff report given  Patient transferred to: PACU    Handoff Report: Identifed the Patient, Identified the Reponsible Provider, Reviewed the pertinent medical history, Discussed the surgical course, Reviewed Intra-OP anesthesia mangement and issues during anesthesia, Set expectations for post-procedure period and Allowed opportunity for questions and acknowledgement of understanding      Vitals:  Vitals Value Taken Time   /57 11/18/24 1211   Temp 36.5  C (97.7  F) 11/18/24 1212   Pulse 53 11/18/24 1212   Resp 36 11/18/24 1212   SpO2 100 % 11/18/24 1212   Vitals shown include unfiled device data.    Electronically Signed By: FRANCY KOO CRNA  November 18, 2024  12:13 PM

## 2024-11-18 NOTE — ANESTHESIA POSTPROCEDURE EVALUATION
Patient: Thad Flores    Procedure: Procedure(s):  RIGHT SHOULDER ARTHROSCOPY, SUBACROMIAL DECOMPRESSION, ROTATOR CUFF REPAIR AND BICEP TENDONESIS  DISTAL CLAVICLE EXCISION       Anesthesia Type:  General    Note:  Disposition: Outpatient   Postop Pain Control: Uneventful            Sign Out: Well controlled pain   PONV: No   Neuro/Psych: Uneventful            Sign Out: Acceptable/Baseline neuro status   Airway/Respiratory: Uneventful            Sign Out: Acceptable/Baseline resp. status   CV/Hemodynamics: Uneventful            Sign Out: Acceptable CV status; No obvious hypovolemia; No obvious fluid overload   Other NRE: NONE   DID A NON-ROUTINE EVENT OCCUR?        Last vitals:  Vitals Value Taken Time   /64 11/18/24 1250   Temp 36.1  C (97  F) 11/18/24 1250   Pulse 47 11/18/24 1250   Resp 18 11/18/24 1250   SpO2 95 % 11/18/24 1250       Electronically Signed By: Breana Weaver MD  November 18, 2024  1:08 PM

## 2024-11-18 NOTE — PHARMACY-ADMISSION MEDICATION HISTORY
Pharmacist Admission Medication History    Admission medication history is complete. The information provided in this note is only as accurate as the sources available at the time of the update.    Information Source(s): Patient and CareEverywhere/SureScripts via in-person    Pertinent Information:  None     Allergies reviewed with patient and updates made in EHR: yes    Medication History Completed By: Jun Henry RPH 11/18/2024 8:20 AM    PTA Med List   Medication Sig Note Last Dose/Taking    aspirin 81 MG tablet Take 81 mg by mouth daily 11/13/2024: LD 11/8 11/8/2024 Morning    Cholecalciferol (VITAMIN D3 PO) Take 1,000 Units by mouth daily.  Past Week Morning    cyanocobalamin (VITAMIN B-12) 1000 MCG sublingual tablet Place 1,000 mcg under the tongue daily.  Past Week Morning    ezetimibe (ZETIA) 10 MG tablet Take 10 mg by mouth daily.  11/18/2024 Morning    losartan (COZAAR) 50 MG tablet Take 50 mg by mouth daily.  11/17/2024 Evening    metFORMIN (GLUCOPHAGE) 500 MG tablet Take 500 mg by mouth 2 times daily (with meals).  11/17/2024 Evening    nitroGLYcerin (NITROSTAT) 0.4 MG sublingual tablet   Taking    psyllium (METAMUCIL/KONSYL) Packet Take 1 packet by mouth daily  11/17/2024 Morning    rosuvastatin (CRESTOR) 20 MG tablet Take 20 mg by mouth daily.  11/18/2024 Morning

## 2024-11-18 NOTE — ANESTHESIA PROCEDURE NOTES
Airway       Patient location during procedure: OR       Procedure Start/Stop Times: 11/18/2024 10:09 AM  Staff -        CRNA: Murali Perez APRN CRNA       Performed By: CRNA  Consent for Airway        Urgency: elective  Indications and Patient Condition       Indications for airway management: jessie-procedural       Induction type:intravenous       Mask difficulty assessment: 1 - vent by mask    Final Airway Details       Final airway type: endotracheal airway       Successful airway: ETT - single  Endotracheal Airway Details        ETT size (mm): 7.5       Cuffed: yes       Cuff volume (mL): 9       Successful intubation technique: direct laryngoscopy       DL Blade Type: Prasad 2       Grade View of Cords: 1       Adjucts: stylet       Position: Left       Measured from: gums/teeth       Secured at (cm): 22       Bite block used: None    Post intubation assessment        Placement verified by: capnometry, equal breath sounds and chest rise        Number of attempts at approach: 1       Number of other approaches attempted: 0       Secured with: tape       Ease of procedure: easy       Dentition: Intact       Dental guard used and removed. Dental Guard Type: Standard White.    Medication(s) Administered   Medication Administration Time: 11/18/2024 10:09 AM

## 2024-11-18 NOTE — OP NOTE
Operative Note    Name:  Thad Flores  :  1960  MRN:  [id number]  Procedure Date:  2024    Preoperative Diagnosis:  RIGHT SHOULDER IMPINGEMENT AND AC JOINT DJD    Postoperative Diagnosis:  Same with full thickness rotator cuff tear of the supraspinatus 1.5cm    Procedures:  1.RIGHT SHOULDER ARTHROSCOPIC ROTATOR CUFF REPAIR AND BICEPS TENODESIS  2.RIGHT SHOULDER  ARTHROSCOPIC ACROMIOPLASTY AND DISTAL CLAVICLE EXCISION    Surgeon(s) and Assistants (if any):    Surgeon(s):   Yohannes Tapia MD    Assistants:   Harriet Lopez PA-C     Circulator: Rachell Graves RN; Gabbie Stanton RN; Valery Kothari RN  Relief Circulator: Letty Leach RN  Scrub Person: Leonor Gilman; Lyndsay Loaiza; Kristy Tang PA-C assistance was required due to the complexity of the procedure, for patient positioning, instrumentation assistance, soft tissue retraction and patient safety.      Anesthesia:   General with interscalene block     Estimated Blood Loss  : 20cc    Specimens: none     Drains: none     Complications: none     Condition on discharge from OR: Stable       INDICATION FOR OPERATION  Thad Flores is a 64 year old male wiith a history of shoulder pain and weakness. Preoperative MRI showed evidence of impingement and ACJ DJD. It was recommended he undergo shoulder arthroscopy and arthroscopic decompression and distal clavicle excision. The risks and benefits of planed procedure as well as details of surgery discussed with patient. Consent was obtained.     REPORT OF OPERATION  The patient brought to the operating room and placed supine on the operating table. An interscalene block was placed by anesthesia preoperatively and given preoperative antibiotic  After induction of general anesthesia he was placed in the lateral decubitus position with an axillary roll and pillows beneath each leg, held in lateral position with a vacuum bean bag. On examination under anesthesia, he  had full  passive range of motion of the shoulder, no stiffness, no instability.  The shoulder was then prepped and draped in normal sterile fashion. A standard posterior glenohumeral arthroscopy portal was established.   Diagnostic arthroscopy was performed. Arthroscopic findings include:    1. Normal glenohumeral joint articular surface.   2. Full thickness rotator cuff tear of the supraspinatus  3. Intact subscapularis  4. Moderate subacromial impingement   5. Minor labral tearing  6. Biceps tendonopathy and partial tear      An anterior portal was established in the rotator interval and a cannula was placed.  A debridement of the glenohumeral joint was performed using a shaver to debride the labrum back to stable tissue.  No significant labral detachment was noted.    A traction suture was placed in the biceps and a proximal biceps tenotomy was performed.     The scope was removed and placed in subacromial space. A complete subacromial bursectomy was performed. Bursal edge of the rotator cuff was débrided back to healthy cuff tissue. Arthroscopic acromioplasty is then performed using motorized bur and anterior and lateral acromial spurs are resected.  Arthroscopic distal clavicle clavicle was performed.   A bone bed was prepared on the tuberosity down to bleeding bony surface.      Two anchors were placed in the medial articular margin of the supraspinatus, one fibertak posteriorly and one 4.75mm swivellock anteriorly preloaded with Fibertape suture.  The biceps traction suture was incorporated in the swivellock eyelet and a biceps tenodesis was accomplished.  The fibertape and suture tapes were passed in a mattress type fashion using a  Scorpion suture passer. The medial row sutures are retrieved and tied and secure medial row fixation was accomplished. Next two 4.75mm Swivellock anchors are used laterally and a suture bridge technique was accomplished with the fibertape sutures with stable fixation of the lateral margin  of the cuff.     All instruments removed and incisions closed using 3-0 Monocryl subcuticular sutures, benzoin and Steri-Strips. Sterile dressings was placed on the shoulder, he was placed in a shoulder sling and transferred in stable awake condition to postanesthesia recovery.      Yohannes Tapia MD MMADELIN.   Date: 11/18/2024  Time: 12:30 PM    Implants:  Implant Name Type Inv. Item Serial No.  Lot No. LRB No. Used Action   IMP ANCHOR ARTHREX BIO-SWIVELOCK 4.75X19.1MM AR-[id number] - [id number] Metallic Hardware/Nathalie IMP ANCHOR ARTHREX BIO-SWIVELOCK 4.75X19.1MM AR-[id number]  ARTHREX [id number] Right 1 Implanted   ANCHOR SUT FIBERTAK 2 TPE SLF PNCH SFT 1.3MM BLK JENNIE WHT - [id number] Metallic Hardware/Nathalie ANCHOR SUT FIBERTAK 2 TPE SLF PNCH SFT 1.3MM BLK JENNIE [facility]  ARTHREX [id number] Right 1 Implanted   IMP ANCHOR ARTHREX BIO-SWIVELOCK 4.75X19.1MM AR-[id number] - [id number] Metallic Hardware/Nathalie IMP ANCHOR ARTHREX BIO-SWIVELOCK 4.75X19.1MM AR-[id number]  ARTHREX [id number] Right 1 Implanted   IMP ANCHOR ARTHREX BIO-SWIVELOCK 4.75X19.1MM AR-[id number] - [id number] Metallic Hardware/Nathalie IMP ANCHOR ARTHREX BIO-SWIVELOCK 4.75X19.1MM [id number]  ARTHREX [id number] Right 1 Implanted

## 2024-11-18 NOTE — ANESTHESIA PROCEDURE NOTES
Cervical Plexus (superficial) Procedure Note    Pre-Procedure   Staff -        Anesthesiologist:  Breana Weaver MD       Performed By: anesthesiologist       Location: pre-op       Procedure Start/Stop Times: 11/18/2024 9:14 AM and 11/18/2024 9:15 AM       Pre-Anesthestic Checklist: patient identified, IV checked, site marked, risks and benefits discussed, informed consent, monitors and equipment checked, pre-op evaluation, at physician/surgeon's request and post-op pain management  Timeout:       Correct Patient: Yes        Correct Procedure: Yes        Correct Site: Yes        Correct Position: Yes        Correct Laterality: Yes        Site Marked: Yes  Procedure Documentation  Procedure: Cervical Plexus (superficial)       Laterality: right       Patient Position: supine       Patient Prep/Sterile Barriers: sterile gloves, mask       Skin prep: Chloraprep       Needle Type: other (Stimuplex, echogenic)       Needle Gauge: 20.        Needle Length (Inches): 4        Ultrasound guided       1. Ultrasound was used to identify targeted nerve, plexus, vascular marker, or fascial plane and place a needle adjacent to it in real-time.       2. Ultrasound was used to visualize the spread of anesthetic in close proximity to the above referenced structure.       3. A permanent image is entered into the patient's record.       4. The visualized anatomic structures appeared normal.       5. There were no apparent abnormal pathologic findings.    Assessment/Narrative         The placement was negative for: blood aspirated, painful injection and site bleeding       Paresthesias: No.       Bolus given via needle..        Secured via.        Insertion/Infusion Method: Single Shot       Complications: none       Injection made incrementally with aspirations every 1 mL.    Medication(s) Administered   Bupivacaine 0.5% PF (Infiltration) - Infiltration   4 mL - 11/18/2024 9:15:00 AM  Medication Administration Time: 11/18/2024  "9:14 AM      FOR [facility] ([facility]) ONLY:   Pain Team Contact information: please page the Pain Team Via [employer]. Search \"Pain\". During daytime hours, please page the attending first. At night please page the resident first.      "

## 2024-11-18 NOTE — OR NURSING
Major Shift Events: Doing well pacu phase ii. Denies pain at this time. Discharge instructions reviewed with spouse and patient, questions answered to verbal satisfaction.     Plan: Discharging to home.     For vital signs and complete assessments, please see documentation flowsheets.      Getachew CASTRO RN

## 2024-11-18 NOTE — ANESTHESIA PREPROCEDURE EVALUATION
"Anesthesia Pre-Procedure Evaluation    Patient: Thad Flores   MRN: [id number] : 1960        Procedure : Procedure(s):  RIGHT SHOULDER ARTHROSCOPY, SUBACROMIAL DECOMPRESSION,  DISTAL CLAVICLE EXCISION          Past Medical History:   Diagnosis Date     Agatston coronary artery calcium score between 200 and 399 2014    361 at [facility]     Anemia 2010    bleeding with hamstring injury     Bradycardia     all of his life, 30's at rest     Diabetes (H)      Elevated glucose 2010    improved with change in health habits     H/O echocardiogram     at [facility]     Heart murmur age 18     Hyperlipidemia 2004     Hypertension      Normal cardiac stress test 2011    at [facility]      Past Surgical History:   Procedure Laterality Date     EP STUDY TILT TABLE N/A 2023    Procedure: Tilt Table Study;  Surgeon: Arnaldo Murray MD;  Location: [facility] CARDIAC CATH LAB     NOSE SURGERY      and  reconstruction     pilonidal  2008     REPAIR TENDON HAMSTRING       SURGICAL PATHOLOGY EXAM Left 2014      Allergies   Allergen Reactions     Penicillins      Fascial swelling     Wasp Venom Protein Other (See Comments)     \"extreme swelling\"      Social History     Tobacco Use     Smoking status: Never     Smokeless tobacco: Not on file   Substance Use Topics     Alcohol use: Yes     Alcohol/week: 8.3 standard drinks of alcohol     Types: 10 drink(s) per week      Wt Readings from Last 1 Encounters:   24 78.4 kg (172 lb 12.8 oz)        Anesthesia Evaluation   Pt has had prior anesthetic.     No history of anesthetic complications       ROS/MED HX  ENT/Pulmonary:  - neg pulmonary ROS     Neurologic:  - neg neurologic ROS     Cardiovascular: Comment: 24 Echo  Final Impressions   1. Normal left ventricular chamber size.   Prominent left ventricular trabeculations at the apex.   Calculated left ventricular ejection fraction 58%.  No regional wall motion abnormalities.   2. Normal " left ventricular filling pressure.   3. Borderline enlarged right ventricular chamber size, normal systolic function, estimated right ventricular systolic pressure 30 mmHg (right atrial pressure of 10 mmHg).   4. Mildly enlarged left atrial size.   5. Mitral valve posterior leaflet prolapse. Thickened mitral valve. Trivial mitral regurgitation.   6. Borderline enlarged sinus of Valsalva diameter of 41 mm. Upper limit of normal for patient's age, sex and BSA is 41 mm.   7. Normal inferior vena cava size with normal inspiratory collapse (>50%).   8. Atrial level shunt by color flow imaging (tiny PFO).   9. No  pericardial effusion.       (+) Dyslipidemia hypertension- -  CAD (elevated calcium score) -  - -                        dysrhythmias (chronic sinus bradycardia), 1st Deg Heart Block,             METS/Exercise Tolerance: >4 METS Comment: Pt has had lightheadedness in past from bradycardia.  Pt has seen cardiologist in past.  Stable.   Hematologic:     (+)      anemia,          Musculoskeletal: Comment: Right shoulder impingement      GI/Hepatic:  - neg GI/hepatic ROS     Renal/Genitourinary:  - neg Renal ROS     Endo:     (+)  type II DM,                    Psychiatric/Substance Use:       Infectious Disease:       Malignancy:       Other:            Physical Exam    Airway        Mallampati: I   TM distance: > 3 FB   Neck ROM: full   Mouth opening: > 3 cm    Respiratory Devices and Support         Dental     Comment: Good        Cardiovascular          Rhythm and rate: regular and bradycardia     Pulmonary   pulmonary exam normal            OUTSIDE LABS:  CBC:   Lab Results   Component Value Date     (A) 11/19/2014     BMP:   Lab Results   Component Value Date     01/29/2016     11/19/2014    POTASSIUM 4.1 01/29/2016    POTASSIUM 4.6 11/19/2014    CHLORIDE 101 01/29/2016    CO2 30 01/29/2016    BUN 23 01/29/2016    CR 1.07 01/29/2016    CR 1.2 11/19/2014     (H) 06/02/2023      "(H) 01/29/2016     COAGS:   Lab Results   Component Value Date    PTT 42 (H) 06/29/2010    INR 1.14 06/29/2010     POC: No results found for: \"BGM\", \"HCG\", \"HCGS\"  HEPATIC:   Lab Results   Component Value Date    ALBUMIN 3.8 01/29/2016    PROTTOTAL 7.4 01/29/2016    ALT 38 01/29/2016    AST 33 01/29/2016    ALKPHOS 82 01/29/2016    BILITOTAL 1.8 (H) 01/29/2016     OTHER:   Lab Results   Component Value Date    A1C 6.1 (H) 01/29/2016    ZACK 8.8 01/29/2016    CRP 0.3 01/29/2016       Anesthesia Plan    ASA Status:  2    NPO Status:  NPO Appropriate    Anesthesia Type: General.     - Airway: LMA   Induction: Intravenous, Propofol.   Maintenance: TIVA.        Consents    Anesthesia Plan(s) and associated risks, benefits, and realistic alternatives discussed. Questions answered and patient/representative(s) expressed understanding.     - Discussed: Risks, Benefits and Alternatives for BOTH SEDATION and the PROCEDURE were discussed     - Discussed with:  Spouse, Patient       - Patient is DNR/DNI Status: No     Use of blood products discussed: Yes.     - Discussed with: Patient.     - Consented: consented to blood products     Postoperative Care    Pain management: Peripheral nerve block (Single Shot), Multi-modal analgesia.   PONV prophylaxis: Ondansetron (or other 5HT-3), Dexamethasone or Solumedrol     Comments:    Other Comments: Robinul in preop for bradycardia.      Right ISB with Exparel for post op pain    TIVA  LMA    Pt's son is an orthopedic surgeon for [employer] Dr. Slava Mayorga MD    I have reviewed the pertinent notes and labs in the chart from the past 30 days and (re)examined the patient.  Any updates or changes from those notes are reflected in this note.                                   "

## 2024-11-18 NOTE — ANESTHESIA PROCEDURE NOTES
Brachial plexus Procedure Note    Pre-Procedure   Staff -        Anesthesiologist:  Breana Weaver MD       Performed By: anesthesiologist       Location: pre-op       Procedure Start/Stop Times: 11/18/2024 9:09 AM and 11/18/2024 9:13 AM       Pre-Anesthestic Checklist: patient identified, IV checked, site marked, risks and benefits discussed, informed consent, monitors and equipment checked, pre-op evaluation, at physician/surgeon's request and post-op pain management  Timeout:       Correct Patient: Yes        Correct Procedure: Yes        Correct Site: Yes        Correct Position: Yes        Correct Laterality: Yes        Site Marked: Yes  Procedure Documentation  Procedure: Brachial plexus       Laterality: right       Patient Position: supine       Skin prep: Chloraprep (interscalene approach).       Needle Type: other (Stimuplex, echogenic)       Needle Gauge: 20.        Needle Length (Inches): 4        Ultrasound guided       1. Ultrasound was used to identify targeted nerve, plexus, vascular marker, or fascial plane and place a needle adjacent to it in real-time.       2. Ultrasound was used to visualize the spread of anesthetic in close proximity to the above referenced structure.       3. A permanent image is entered into the patient's record.       4. The visualized anatomic structures appeared normal.       5. There were no apparent abnormal pathologic findings.    Assessment/Narrative         The placement was negative for: blood aspirated, painful injection and site bleeding       Paresthesias: No.       Bolus given via needle..        Secured via.        Insertion/Infusion Method: Single Shot       Complications: none       Injection made incrementally with aspirations every 3 mL.    Medication(s) Administered   Bupivacaine 0.5% PF (Infiltration) - Infiltration   11 mL - 11/18/2024 9:13:00 AM  Bupivacaine liposome (Exparel) 1.3% LA inj susp (Infiltration) - Infiltration   10 mL - 11/18/2024 9:13:00  "AM  Medication Administration Time: 11/18/2024 9:09 AM      FOR [facility] ([place] ONLY:   Pain Team Contact information: please page the Pain Team Via [employer]. Search \"Pain\". During daytime hours, please page the attending first. At night please page the resident first.      "

## 2025-01-08 ENCOUNTER — TELEPHONE (OUTPATIENT)
Dept: CARDIOLOGY | Facility: CLINIC | Age: 65
End: 2025-01-08
Payer: COMMERCIAL

## 2025-01-13 ENCOUNTER — TELEPHONE (OUTPATIENT)
Dept: CARDIOLOGY | Facility: CLINIC | Age: 65
End: 2025-01-13
Payer: COMMERCIAL

## 2025-05-10 ENCOUNTER — HEALTH MAINTENANCE LETTER (OUTPATIENT)
Age: 65
End: 2025-05-10

## 2025-06-21 ENCOUNTER — HEALTH MAINTENANCE LETTER (OUTPATIENT)
Age: 65
End: 2025-06-21

## 2025-08-23 ENCOUNTER — HEALTH MAINTENANCE LETTER (OUTPATIENT)
Age: 65
End: 2025-08-23

## (undated) DEVICE — SOL WATER IRRIG 1000ML BOTTLE 2F7114

## (undated) DEVICE — GLOVE BIOGEL PI ULTRATOUCH G SZ 6.5 42165

## (undated) DEVICE — GLOVE UNDER INDICATOR PI SZ 7.0 LF 41670

## (undated) DEVICE — SU ETHILON 3-0 PS-1 18" 1663G

## (undated) DEVICE — SU FIBERTAPE 2MM  AR-7237-7

## (undated) DEVICE — GOWN IMPERVIOUS BREATHABLE SMART LG 89015

## (undated) DEVICE — GLOVE BIOGEL PI ULTRATOUCH G SZ 7.0 42170

## (undated) DEVICE — NDL ARTHREX MULTIFIRE/FASTPASS SCORPION AR-13995N

## (undated) DEVICE — GLOVE BIOGEL PI ULTRATOUCH G SZ 8.5 42185

## (undated) DEVICE — ARTHROSCOPIC CANNULA 7MMX7CM PURPLE  AR-6550

## (undated) DEVICE — DRESSING XEROFORM PETROLATUM 5X9 33605

## (undated) DEVICE — ARTHROSCOPIC CANNULA TWIST-IN PURPLE 7MMX7CM AR-6570

## (undated) DEVICE — SUCTION MANIFOLD NEPTUNE 2 SYS 4 PORT 0702-020-000

## (undated) DEVICE — BUR ARTHREX COOLCUT BONE CUTTER 5.0MMX13CM AR-8500BC

## (undated) DEVICE — GLOVE UNDER INDICATOR PI SZ 8.5 LF 41685

## (undated) DEVICE — GOWN IMPERVIOUS BREATHABLE 2XL/XLONG

## (undated) DEVICE — PUSHER KNOT FINGER ARTHREX W/SU PASSER AR-1930S

## (undated) DEVICE — CUSTOM PACK LATERALSHOULDER SCOPE PRE SOP5BLSHEA

## (undated) DEVICE — NEEDLE SCORPION HD MEGALOADER AR-13999HDN

## (undated) DEVICE — ABLATOR ARTHREX APOLLO RF ASPIRATING 90DEG AR-9831

## (undated) DEVICE — DRSG STERI STRIP 1/2X4" R1547

## (undated) DEVICE — DRAPE IOBAN INCISE 23X17" 6650EZ

## (undated) DEVICE — Device

## (undated) DEVICE — CUFF FINGER CLEARSIGHT MED CSCM

## (undated) DEVICE — BUR ARTHREX FLUSHCUT OVAL 5.0MMX13CM AR-8500FOE

## (undated) DEVICE — MAT FLOOR WATERPROOF BACKSHEET FMBP30

## (undated) DEVICE — TUBING SUCTION MEDI-VAC 1/4"X20' N620A

## (undated) DEVICE — DRAPE SLEEVE ARTHREX STAR SHOULDER TRACTION  AR-1606

## (undated) DEVICE — TUBING SYSTEM ARTHREX PATIENT REDEUCE AR-6421

## (undated) DEVICE — HOLDER LIMB VELCRO OR 0814-1533

## (undated) DEVICE — SOL NACL 0.9% INJ 1000ML BAG 2B1324X

## (undated) RX ORDER — FENTANYL CITRATE 50 UG/ML
INJECTION, SOLUTION INTRAMUSCULAR; INTRAVENOUS
Status: DISPENSED
Start: 2024-11-18

## (undated) RX ORDER — EPHEDRINE SULFATE 50 MG/ML
INJECTION, SOLUTION INTRAMUSCULAR; INTRAVENOUS; SUBCUTANEOUS
Status: DISPENSED
Start: 2024-11-18

## (undated) RX ORDER — PROPOFOL 10 MG/ML
INJECTION, EMULSION INTRAVENOUS
Status: DISPENSED
Start: 2024-11-18

## (undated) RX ORDER — NITROGLYCERIN 0.4 MG/1
TABLET SUBLINGUAL
Status: DISPENSED
Start: 2023-06-02

## (undated) RX ORDER — DEXAMETHASONE SODIUM PHOSPHATE 4 MG/ML
INJECTION, SOLUTION INTRA-ARTICULAR; INTRALESIONAL; INTRAMUSCULAR; INTRAVENOUS; SOFT TISSUE
Status: DISPENSED
Start: 2024-11-18

## (undated) RX ORDER — SODIUM CHLORIDE 9 MG/ML
INJECTION, SOLUTION INTRAVENOUS
Status: DISPENSED
Start: 2023-06-02

## (undated) RX ORDER — GLYCOPYRROLATE 0.2 MG/ML
INJECTION, SOLUTION INTRAMUSCULAR; INTRAVENOUS
Status: DISPENSED
Start: 2024-11-18